# Patient Record
Sex: FEMALE | Race: BLACK OR AFRICAN AMERICAN | NOT HISPANIC OR LATINO | Employment: UNEMPLOYED | URBAN - METROPOLITAN AREA
[De-identification: names, ages, dates, MRNs, and addresses within clinical notes are randomized per-mention and may not be internally consistent; named-entity substitution may affect disease eponyms.]

---
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
## 2021-08-03 ENCOUNTER — HOSPITAL ENCOUNTER (EMERGENCY)
Facility: HOSPITAL | Age: 21
Discharge: HOME | End: 2021-08-03
Attending: EMERGENCY MEDICINE | Admitting: EMERGENCY MEDICINE
Payer: COMMERCIAL

## 2021-08-03 VITALS
HEART RATE: 95 BPM | OXYGEN SATURATION: 99 % | BODY MASS INDEX: 21.3 KG/M2 | DIASTOLIC BLOOD PRESSURE: 65 MMHG | SYSTOLIC BLOOD PRESSURE: 105 MMHG | RESPIRATION RATE: 18 BRPM | WEIGHT: 130 LBS | TEMPERATURE: 99.3 F

## 2021-08-03 DIAGNOSIS — Z20.822 PERSON UNDER INVESTIGATION FOR COVID-19: ICD-10-CM

## 2021-08-03 LAB — SARS-COV-2 RNA RESP QL NAA+PROBE: POSITIVE

## 2021-08-03 PROCEDURE — C9803 HOPD COVID-19 SPEC COLLECT: HCPCS

## 2021-08-03 PROCEDURE — 87635 SARS-COV-2 COVID-19 AMP PRB: CPT | Performed by: EMERGENCY MEDICINE

## 2021-08-03 PROCEDURE — 99283 EMERGENCY DEPT VISIT LOW MDM: CPT

## 2021-08-03 RX ORDER — ONDANSETRON 4 MG/1
4 TABLET, ORALLY DISINTEGRATING ORAL EVERY 8 HOURS PRN
Qty: 12 TABLET | Refills: 0 | Status: SHIPPED | OUTPATIENT
Start: 2021-08-03 | End: 2021-08-06

## 2021-08-03 ASSESSMENT — ENCOUNTER SYMPTOMS
FEVER: 0
WHEEZING: 0
ARTHRALGIAS: 0
HEADACHES: 0
APPETITE CHANGE: 0
FLANK PAIN: 0
COLOR CHANGE: 0
MYALGIAS: 0
FATIGUE: 0
ACTIVITY CHANGE: 0
HEMATURIA: 0
LIGHT-HEADEDNESS: 0
JOINT SWELLING: 0
NAUSEA: 1
VOMITING: 0
DIARRHEA: 0
COUGH: 1
ABDOMINAL PAIN: 0
RHINORRHEA: 0
SHORTNESS OF BREATH: 0

## 2021-08-03 NOTE — ED PROVIDER NOTES
Emergency Department Encounter     Evaluation Date & Time:   No admission date for patient encounter.    CHIEF COMPLAINT:  Suspected Covid      Triage Note:Exposure to COVID from boyfriend; test earlier today, here for another test since that one isn't back yet.        Impression and Plan     ED COURSE & MEDICAL DECISION MAKIN:55 PM I met with the patient, obtained an initial history, performed an examination and discussed the plan. PPE worn throughout all interactions with the patient, including gloves, surgical mask, N95 mask, safety glasses, and surgical cap.     20-year-old female presenting for evaluation.  Patient presenting with symptoms consistent with COVID-19 including cough, body aches, subjective fevers, loss of smell and taste.  The patient is well-appearing in no distress.  Boyfriend had COVID-19 and she has been exposed.  Patient denies any chest pain or shortness of breath.  Had a COVID-19 test at [facility] done earlier today but does not want a wait 48 hours with the results.  Discussed with her that we will test her for COVID-19 however would not have the results for probably more than 1 hour, after discussion patient is okay with discharge with being called the results of positive.  Patient's no additional questions or concerns.  Signs are stable oxygen saturations greater than 99%.  Not feel that further work-up necessary.    At the conclusion of the encounter I discussed the results of all the tests and the disposition. The questions were answered. The patient or family acknowledged understanding and was agreeable with the care plan.    FINAL IMPRESSION:    ICD-10-CM    1. Person under investigation for COVID-19  Z20.822 Primary Care Referral (ED)       0 minutes of critical care time    Reassessments & Consults       MEDICATIONS GIVEN IN THE EMERGENCY DEPARTMENT:  Medications - No data to display    NEW PRESCRIPTIONS STARTED AT TODAY'S ED VISIT:  New Prescriptions    ONDANSETRON  (ZOFRAN ODT) 4 MG ODT TAB    Take 1 tablet (4 mg) by mouth every 8 hours as needed for nausea       HPI     HPI     Ayyantuu ALICIA Mancia is a 20 year old female with a pertinent history of myasthenia gravis, seasonal allergies who presents to this ED by means of private vehicle for evaluation of COVID-19 exposure.     The patient reports her boyfriend was diagnosed with COVID-19 and over the last two days she has been experiencing similar symptoms. She endorses, dry cough, loss of taste and smell, and sudden onset nausea. She received a COVID-19 test from [facility] earlier today but that will not result for 48 hours so she came into the ED to receive a faster result. She is otherwise in her usual state of health and denies any chest pain, shortness of breath, or any other concerns at this time.     REVIEW OF SYSTEMS:  Review of Systems   Constitutional: Negative for activity change, appetite change, fatigue and fever.        Positive for loss of taste and smell.    HENT: Negative for congestion and rhinorrhea.    Respiratory: Positive for cough. Negative for shortness of breath and wheezing.    Cardiovascular: Negative for chest pain and leg swelling.   Gastrointestinal: Positive for nausea. Negative for abdominal pain, diarrhea and vomiting.   Genitourinary: Negative for flank pain, hematuria and urgency.   Musculoskeletal: Negative for arthralgias, joint swelling and myalgias.   Skin: Negative for color change and rash.   Neurological: Negative for syncope, light-headedness and headaches.   Psychiatric/Behavioral: Negative for self-injury and suicidal ideas.   All other systems reviewed and are negative.        Medical History     Past Medical History:   Diagnosis Date     Allergic state      Myasthenia gravis (H)      Uncomplicated asthma        Past Surgical History:   Procedure Laterality Date     BRONCHOSCOPY (RIGID OR FLEXIBLE), DIAGNOSTIC N/A 8/15/2018    Procedure: COMBINED BRONCHOSCOPY (RIGID OR FLEXIBLE),  LAVAGE;  Flexible Bronchoscopy with Lavage (BAL) , Left Thoracoscopic Thymectomy ;  Surgeon: Vanessa Danielson MD;  Location: [facility] OR     NO HISTORY OF SURGERY       THORACOSCOPIC THYMECTOMY Left 8/15/2018    Procedure: THORACOSCOPIC THYMECTOMY;;  Surgeon: Arias Estevez MD;  Location: [facility] OR       Family History   Problem Relation Age of Onset     Other - See Comments Sister        Social History     Tobacco Use     Smoking status: Never Smoker     Smokeless tobacco: Never Used   Substance Use Topics     Alcohol use: No     Drug use: No       ondansetron (ZOFRAN ODT) 4 MG ODT tab  albuterol (2.5 MG/3ML) 0.083% neb solution  ORDER FOR DME, SET TO LOCAL PRINT,  pyridostigmine (MESTINON) 60 MG tablet        Physical Exam     First Vitals:  Patient Vitals for the past 24 hrs:   BP Temp Temp src Pulse Resp SpO2 Weight   08/03/21 1734 105/65 99.3  F (37.4  C) Oral 95 18 99 % 59 kg (130 lb)       PHYSICAL EXAM:   Physical Exam  Vitals and nursing note reviewed.   Constitutional:       General: She is not in acute distress.     Appearance: Normal appearance. She is not ill-appearing.   HENT:      Head: Normocephalic and atraumatic.      Right Ear: External ear normal.      Left Ear: External ear normal.      Nose: Nose normal.      Mouth/Throat:      Mouth: Mucous membranes are moist.   Eyes:      Extraocular Movements: Extraocular movements intact.      Pupils: Pupils are equal, round, and reactive to light.   Cardiovascular:      Rate and Rhythm: Normal rate and regular rhythm.   Pulmonary:      Effort: Pulmonary effort is normal. No respiratory distress.      Breath sounds: Normal breath sounds. No stridor. No wheezing.   Abdominal:      General: There is no distension.      Palpations: Abdomen is soft. There is no mass.      Tenderness: There is no abdominal tenderness. There is no guarding or rebound.      Hernia: No hernia is present.   Musculoskeletal:         General: No swelling, tenderness or signs of  injury. Normal range of motion.      Cervical back: Normal range of motion.   Skin:     General: Skin is warm and dry.      Capillary Refill: Capillary refill takes less than 2 seconds.   Neurological:      General: No focal deficit present.      Mental Status: She is alert and oriented to person, place, and time.      Cranial Nerves: No cranial nerve deficit.      Motor: No weakness.      Coordination: Coordination normal.      Gait: Gait normal.   Psychiatric:         Mood and Affect: Mood normal.           Results     LAB:  All pertinent labs reviewed and interpreted  Labs Ordered and Resulted from Time of ED Arrival Up to the Time of Departure from the ED   SARS-COV2 (COVID-19) VIRUS RT-PCR - Abnormal; Notable for the following components:       Result Value    SARS CoV2 PCR Positive (*)     All other components within normal limits    Narrative:     Testing was performed using the wil  SARS-CoV-2 & Influenza A/B Assay on the wil  Valarie  System.  This test should be ordered for the detection of SARS-COV-2 in individuals who meet SARS-CoV-2 clinical and/or epidemiological criteria. Test performance is unknown in asymptomatic patients.  This test is for in vitro diagnostic use under the FDA EUA for laboratories certified under CLIA to perform moderate and/or high complexity testing. This test has not been FDA cleared or approved.  A negative test does not rule out the presence of PCR inhibitors in the specimen or target RNA in concentration below the limit of detection for the assay. The possibility of a false negative should be considered if the patient's recent exposure or clinical presentation suggests COVID-19.  [facility] are certified under the Clinical Laboratory Improvement Amendments of 1988 (CLIA-88) as qualified to perform moderate and/or high complexity laboratory testing.   COVID-19 VIRUS (CORONAVIRUS) BY PCR    Narrative:     The following orders were created for panel order  Symptomatic COVID-19 Virus (Coronavirus) by PCR Nasopharyngeal.  Procedure                               Abnormality         Status                     ---------                               -----------         ------                     SARS-COV2 (COVID-19) Vir...[[id number]]  Abnormal            Final result                 Please view results for these tests on the individual orders.       RADIOLOGY:  No orders to display              [facility] Documentation       Chacorta Tom DO  Emergency Medicine  [facility] EMERGENCY DEPARTMENT       Chacorta Tom DO  08/03/21 [id number]

## 2021-08-03 NOTE — ED NOTES
Patient left prior to reviewing discharge instructions/RX.  Attempted to call patient without response.  Will mail discharge instructions and RX to patient.

## 2021-08-03 NOTE — ED TRIAGE NOTES
Exposure to COVID from boyfriend; test earlier today, here for another test since that one isn't back yet.

## 2021-10-22 ENCOUNTER — TELEPHONE (OUTPATIENT)
Dept: NEUROLOGY | Facility: CLINIC | Age: 21
End: 2021-10-22

## 2021-10-22 NOTE — TELEPHONE ENCOUNTER
Attempted to call patient x2 due to a page call that she was experiencing weakness. No answer, no voicemail.

## 2021-11-01 ENCOUNTER — OFFICE VISIT (OUTPATIENT)
Dept: NEUROLOGY | Facility: CLINIC | Age: 21
End: 2021-11-01
Payer: COMMERCIAL

## 2021-11-01 VITALS
OXYGEN SATURATION: 99 % | RESPIRATION RATE: 16 BRPM | DIASTOLIC BLOOD PRESSURE: 78 MMHG | SYSTOLIC BLOOD PRESSURE: 119 MMHG | HEART RATE: 74 BPM

## 2021-11-01 DIAGNOSIS — G70.00 MYASTHENIA GRAVIS WITHOUT EXACERBATION (H): Primary | ICD-10-CM

## 2021-11-01 PROCEDURE — 99203 OFFICE O/P NEW LOW 30 MIN: CPT | Mod: GC | Performed by: PSYCHIATRY & NEUROLOGY

## 2021-11-01 ASSESSMENT — PAIN SCALES - GENERAL: PAINLEVEL: NO PAIN (0)

## 2021-11-01 NOTE — PATIENT INSTRUCTIONS
Please contact us (phone or [facility]) if you would like to restart Mestinon or start another immunosuppressant medication

## 2021-11-01 NOTE — NURSING NOTE
Chief Complaint   Patient presents with     RECHECK     [facility] RETURN MYASTHENIA GRAVIS      Allen óGmez

## 2021-11-01 NOTE — PROGRESS NOTES
"             Pediatric Neurology Clinic      Tushar Mancia MRN# [id number]   YOB: 2000 Age: 19 year old      Date of Visit: Nov 1, 2021    Primary care provider: No Ref-Primary, Physician         Chief Complaint:     Follow up myasthenia gravis.       History is obtained from the patient, family and medical record       History of Present Illness:      Tushar Mancia is a 21 year old female who was seen and examined at the pediatric neurology clinic on Nov 1, 2021 for evaluation of myasthenia gravis, last seen in clinic in 2019.    Has noticed occasional drooping of the right eyelid, usually at the end of the day or when she is tired. DV will come and go, worse when tired, couple times a week. Will occasionally choke on saliva, but denies outright dysphagia (worse when sick). Some weakness and shooting pains in the arms/legs. Overall feels pretty stable in the last 6 months.  Notes that everything has been improved in the last 2 years since her thymus was removed. With the cold notices her joints/muscles are more painful. Stopped Mestinon back in 2019.     She still is experiencing shortness of breath with exercise, fatigue (Vit D deficient), and lightheadedness often in the AM or at the end of a long day. Has troubles with \"working out\" more from pain than weakness, but is harder to do basic exercises. Feels hard to keep a job due to feeling sick (minor illness are significant), hasn't worked full time in last 6 months. 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              Past Medical History:     Past Medical History:   Diagnosis Date     Allergic state      Myasthenia gravis (H)      Uncomplicated asthma               Past Surgical History:     Past Surgical History:   Procedure Laterality Date     BRONCHOSCOPY (RIGID OR FLEXIBLE), DIAGNOSTIC N/A 8/15/2018    Procedure: COMBINED BRONCHOSCOPY (RIGID OR FLEXIBLE), LAVAGE;  Flexible Bronchoscopy with Lavage (BAL) , Left " Thoracoscopic Thymectomy ;  Surgeon: Vanessa Danielson MD;  [facility]     NO HISTORY OF SURGERY       THORACOSCOPIC THYMECTOMY Left 8/15/2018    Procedure: THORACOSCOPIC THYMECTOMY;;  Surgeon: Arias Estevez MD;  [facility] OR             Social History:      Pediatric History   Patient Parents     Aman Callahan (Mother)     Blanche Shrestha (Father)     Other Topics Concern     Not on file   Social History Narrative    May 10, 2018.date:     Tushar lives with her parents two sisters and two brothers. They have no pets.     Tushar is in the 11th grade and does well in school. She works at [employer].     Dad is a registered nurse and Mom is a nursing assistant.     There are no significant stressors at home or school.             Family History:     Family History   Problem Relation Age of Onset     Other - See Comments Sister              Immunizations:     Immunization History   Administered Date(s) Administered     Comvax (HIB/HepB) 2000, 2001, 09/03/2002     DTAP (<7y) 2001, 2001, 03/05/2002, 11/27/2002, 08/30/2006     HEPA 05/06/2003, 05/31/2005     HPV 06/07/2013, 11/26/2013, 04/03/2014     Influenza (H1N1) 12/04/2009, 12/28/2009     Influenza Vaccine, 6+MO IM (QUADRIVALENT W/PRESERVATIVES) 11/27/2002, 11/07/2005, 12/04/2009, 09/22/2011, 11/26/2013     MMR 11/27/2002, 08/30/2006     Meningococcal (Menomune ) 06/07/2013     Pneumococcal (PCV 7) 2001, 2001, 03/05/2002     Poliovirus, inactivated (IPV) 2001, 2001, 03/05/2002, 08/30/2006     Tdap (Adacel,Boostrix) 06/07/2013     Typhoid IM 05/06/2003     Varicella 09/03/2002, 08/30/2006     Yellow Fever 05/06/2003            Allergies:    No Known Allergies          Medications:     Prescription Medications as of 11/1/2021       Rx Number Disp Refills Start End Last Dispensed Date Next Fill Date Owning Pharmacy    albuterol (2.5 MG/3ML) 0.083% neb solution            Sig: Take 1 vial by  nebulization every 6 hours as needed for shortness of breath / dyspnea or wheezing    Class: Historical    Route: Nebulization    ORDER FOR DME, SET TO LOCAL PRINT,  1 Device 0 5/21/2015    [facility] - [place], [place] - [address], [address]    Sig: Equipment being ordered: knee brace    Class: Local Print    pyridostigmine (MESTINON) 60 MG tablet  360 tablet 3 9/19/2018    [id number] - [place], [place] - [address] AT [facility]    Sig: Take 1 tablet (60 mg) by mouth 4 times daily    Class: E-Prescribe    Route: Oral         Not taking medications         Review of Systems:   The 10 point Review of Systems is negative other than noted in the HPI         Physical Exam:     /78   Pulse 74   Resp 16   SpO2 99%    Physical Exam:   General: NAD, cooperative on exam   Neurologic:   Mental Status Exam: Alert, awake and easily engaged in interaction.   Cranial Nerves: EOMs intact, no nystagmus, facial movements symmetric,                 facial sensation intact to light touch, hearing intact to conversation, palate and uvula               rise symmetrically, no deviation in uvula or tongue, tongue midline and fully mobile                with no atrophy or fasciculations.  Motor: Normal tone in all four extremities, no atrophy or fasciculations. No tremors.           Manual muscle examination: Neck flexion 4.5, shoulder abduction 4.5, eld ext., flx, wrist flx and ext.  5/5, FDI 4/5; hip flexion 5/5, knee ext/flx 5, foot dorsiflx and plantar flx 5.  Myasthenia Gravis Worksheet    Test item None Mild Moderate Severe Score   Grade 0 1 2 3    Diplopia (lateral gaze), R or L, s 60 11-59 1-10 spontaneous 1   Ptosis (upward gaze), s 60 11-59 1-10 spontaneous 1   Facial weakness   (Eyelid closure) Normal Some resistence No resistance Incomplete 1   Dysarthria with counting 1-50 >50 30-49 10-29 9 0   Swallowing 4 oz water Normal mild  Severe (regurgitation)  Not able    Right arm held outstretched at 90 deg, s 240  10-89 0-9 1 (95)   Left arm held outstretched at 90 deg, s 240  10-89 0-9 1 (95)   Vital capacity, % predicted 80 65-79 50-64 <50    Right hand , kgW, M/F 45/30 15-44/10-29 5-14/5-9 0-4/0-4 ND   Left hand , kgW, man/woman 35/25 15-34/10-24 5-14/5-9 0-4/0-4 ND   Head lift 45 deg supine, s 120  1-29 0 2(15)   Right leg held outstretched at 45 deg supine, s 100 31-99 1-30 0 2 (22)   Left leg held outstretched at 45 deg supine, s 100 31-99 1-30 0 2 (26)     Score 11     Sensory: intact LT   Coordination: No overt dysmetria seen.    Reflexes: 2+ and symmetric in triceps, biceps, brachioradialis, patellar, Achilles.            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 Her symptoms are partially controlled mainly involving skeletal muscle with good bulbar and respiratory function but as obvious disease activity on testing. Discussed pros and cons of mestinon and other immunosuppressant medications. Discussed potential for long term treatment, especially when she is out of school.  Patient declined use of at this time. Will contact the clinic if she would like to restart.     Recommendations:  -  F/U in 6 months    Christine Russ MD  Neuromuscular Fellow    I personally examined this patient with the fellow Dr. Russ.  This note details our findings, and the impression and plan that we formulated together.  Our recommendations were discussed with the other providers and patient and/or family.   I have spent at least 30 min on the date of the encounter in chart review, patient visit, review of tests, counseling the patient, documentation about the issues documented  above. See note for details.    Sincerely,        Obi Dave MD  Pediatric Neurology  [phone]           CC  Patient Care Team:  No Ref-Primary, Physician as PCP - General  Neha Connelly,  as PCP - Pediatrics  Leda Quinn MD as MD (Pediatric Rheumatology)  Obi Dave MD as MD (Psychiatry & Neurology - Child & Adolescent Psychiatry)  SELF, REFERRED    Copy to patient  [facility]  [address]  [place] [id number]

## 2021-11-12 ENCOUNTER — HOSPITAL ENCOUNTER (EMERGENCY)
Facility: HOSPITAL | Age: 21
Discharge: HOME | End: 2021-11-12
Attending: EMERGENCY MEDICINE | Admitting: EMERGENCY MEDICINE
Payer: COMMERCIAL

## 2021-11-12 VITALS
DIASTOLIC BLOOD PRESSURE: 66 MMHG | BODY MASS INDEX: 21.31 KG/M2 | WEIGHT: 130.07 LBS | TEMPERATURE: 98.1 F | RESPIRATION RATE: 17 BRPM | SYSTOLIC BLOOD PRESSURE: 125 MMHG | HEART RATE: 86 BPM | OXYGEN SATURATION: 96 %

## 2021-11-12 DIAGNOSIS — Z87.898 HISTORY OF PERSISTENT COUGH: ICD-10-CM

## 2021-11-12 DIAGNOSIS — R10.9 LEFT FLANK PAIN: ICD-10-CM

## 2021-11-12 LAB
ALBUMIN UR-MCNC: 30 MG/DL
APPEARANCE UR: CLEAR
BILIRUB UR QL STRIP: NEGATIVE
COLOR UR AUTO: YELLOW
FLUAV RNA SPEC QL NAA+PROBE: NEGATIVE
FLUBV RNA RESP QL NAA+PROBE: NEGATIVE
GLUCOSE UR STRIP-MCNC: NEGATIVE MG/DL
HGB UR QL STRIP: NEGATIVE
KETONES UR STRIP-MCNC: 40 MG/DL
LEUKOCYTE ESTERASE UR QL STRIP: NEGATIVE
MUCOUS THREADS #/AREA URNS LPF: PRESENT /LPF
NITRATE UR QL: NEGATIVE
PH UR STRIP: 5.5 [PH] (ref 5–7)
RBC URINE: 2 /HPF
SARS-COV-2 RNA RESP QL NAA+PROBE: NEGATIVE
SP GR UR STRIP: 1.03 (ref 1–1.03)
SQUAMOUS EPITHELIAL: 3 /HPF
TRANSITIONAL EPI: <1 /HPF
UROBILINOGEN UR STRIP-MCNC: <2 MG/DL
WBC URINE: 2 /HPF

## 2021-11-12 PROCEDURE — 99283 EMERGENCY DEPT VISIT LOW MDM: CPT

## 2021-11-12 PROCEDURE — 81001 URINALYSIS AUTO W/SCOPE: CPT | Performed by: EMERGENCY MEDICINE

## 2021-11-12 PROCEDURE — C9803 HOPD COVID-19 SPEC COLLECT: HCPCS

## 2021-11-12 PROCEDURE — 87636 SARSCOV2 & INF A&B AMP PRB: CPT | Performed by: EMERGENCY MEDICINE

## 2021-11-12 RX ORDER — PANTOPRAZOLE SODIUM 40 MG/1
40 TABLET, DELAYED RELEASE ORAL DAILY
Qty: 30 TABLET | Refills: 0 | Status: SHIPPED | OUTPATIENT
Start: 2021-11-12 | End: 2021-12-12

## 2021-11-12 RX ORDER — ALBUTEROL SULFATE 0.83 MG/ML
2.5 SOLUTION RESPIRATORY (INHALATION) EVERY 4 HOURS PRN
Qty: 60 ML | Refills: 0 | Status: SHIPPED | OUTPATIENT
Start: 2021-11-12

## 2021-11-12 RX ORDER — NAPROXEN 500 MG/1
250 TABLET ORAL 2 TIMES DAILY PRN
Qty: 24 TABLET | Refills: 0 | Status: SHIPPED | OUTPATIENT
Start: 2021-11-12 | End: 2021-11-20

## 2021-11-12 NOTE — ED TRIAGE NOTES
Pt states she's had a cough for the past several days and on Wednesday she states when she coughed she felt like she pulled a muscle in in her left flank area.  Pt denies taking anything for the pain.

## 2021-11-13 NOTE — ED PROVIDER NOTES
EMERGENCY DEPARTMENT NOTE     Name: Tushar Mancia    Age/Sex: 21 year old female   MRN: [id number]   Evaluation Date & Time:  11/12/2021  7:19 PM    PCP:    No Ref-Primary, Physician   ED Provider: Jean Marie Kwan D.O.       CHIEF COMPLAINT    Abdominal Pain       DIAGNOSIS & DISPOSITION     1. Left flank pain    2. History of persistent cough      DISPOSITION: Home    At the conclusion of the encounter I discussed the results of all of the tests and the disposition. The questions were answered. The patient or family acknowledged understanding and was agreeable with the care plan.    TOTAL CRITICAL CARE TIME (EXCLUDING PROCEDURES): Not applicable    PROCEDURES:   None    EMERGENCY DEPARTMENT COURSE/MEDICAL DECISION MAKING   8:04 PM  I met with the patient to gather history and to perform my initial exam.  We discussed treatment options and the plan for care while in the Emergency Department.  8:14 PM Patient to be discharged by RN.    Tushar Mancia female with a relevant past history of GERD, Myasthenia gravis with exacerbation, asthma, s/p thymectomy, who presents to this ED via walk-in for evaluation of abdominal pain.    Patient reports a cough for the past several days and notes that two days ago, she developed left lower quadrant abdominal pain. She has taken Ibuprofen and used ice without relief.    Triage note reviewed:Pt states she's had a cough for the past several days and on Wednesday she states when she coughed she felt like she pulled a muscle in in her left flank area.  Pt denies taking anything for the pain.    Vital signs:/66   Pulse 86   Temp 98.1  F (36.7  C) (Oral)   Resp 17   Wt 59 kg (130 lb 1.1 oz)   SpO2 96%   BMI 21.31 kg/m    Pertinent physical exam findings:  Cardiac: Regular rate and rhythm S1-S2 without murmur rub  Pulmonary: Lungs are clear to ascultation bilaterally with good breath sounds  Abdomen: Soft nontender, positive bowel sounds.  No organomegaly or  mass  Diagnostic studies:  Imaging:None  Lab: COVID-19, influenza negative UA without pyuria, hematuria or bacteriuria  Interventions:None  Medical decision making: Patient has not required pain medication in the emergency department.  With nontender abdominal exam low suspicion for intra-abdominal process including ovarian cyst rupture, ovarian torsion, obstructing urolithiasis, diverticulitis, colitis, appendicitis.  Cough is nonproductive, she has no associated shortness of breath, hemoptysis and low suspicion for pneumonia or other process including pulmonary embolism.  I discussed options with the patient and her mother and they are comfortable with discharge with management of cough and pain medication without further evaluation including imaging.  Etiology of cough is not identified.  She has had some mild URI symptoms.  Influenza Covid negative.  No history of asthma but in the past has used albuterol nebulizer for cough management and will represcribe.  Not currently wheezing and steroids not indicated.  She does report some symptoms of reflux and will start her on Protonix.  Patient will use Tylenol and Naprosyn for pain management.  Patient will follow up with primary care physician early next week.  Return criteria discussed and if progressive symptoms with development of chest pain, shortness of breath, fever, abdominal pain that persists not improved with pain medication and not associated with coughing will return to the emergency department.    ED INTERVENTIONS   Medications - No data to display    DISCHARGE MEDICATIONS        Review of your medicines      UNREVIEWED medicines. Ask your doctor about these medicines      Dose / Directions   pyridostigmine 60 MG tablet  Commonly known as: MESTINON  Used for: Myasthenia gravis (H)      Dose: 60 mg  Take 1 tablet (60 mg) by mouth 4 times daily  Quantity: 360 tablet  Refills: 3        START taking      Dose / Directions   naproxen 500 MG tablet  Commonly  known as: NAPROSYN      Dose: 250 mg  Take 0.5 tablets (250 mg) by mouth 2 times daily as needed for moderate pain  Quantity: 24 tablet  Refills: 0     pantoprazole 40 MG EC tablet  Commonly known as: Protonix      Dose: 40 mg  Take 1 tablet (40 mg) by mouth daily for 30 doses  Quantity: 30 tablet  Refills: 0        CONTINUE these medicines which may have CHANGED, or have new prescriptions. If we are uncertain of the size of tablets/capsules you have at home, strength may be listed as something that might have changed.      Dose / Directions   albuterol (2.5 MG/3ML) 0.083% neb solution  Commonly known as: PROVENTIL  This may have changed:     when to take this    reasons to take this      Dose: 2.5 mg  Take 1 vial (2.5 mg) by nebulization every 4 hours as needed (cough)  Quantity: 60 mL  Refills: 0        CONTINUE these medicines which have NOT CHANGED      Dose / Directions   order for DME  Used for: Right knee pain      Equipment being ordered: knee brace  Quantity: 1 Device  Refills: 0           Where to get your medicines      Some of these will need a paper prescription and others can be bought over the counter. Ask your nurse if you have questions.    Bring a paper prescription for each of these medications    albuterol (2.5 MG/3ML) 0.083% neb solution    naproxen 500 MG tablet    pantoprazole 40 MG EC tablet           INFORMATION SOURCE AND LIMITATIONS    History/Exam limitations: None  Patient information was obtained from: Patient  Use of : N/A    HISTORY OF PRESENT ILLNESS   Tushar Mancia female with a relevant past history of GERD, Myasthenia gravis with exacerbation, asthma, s/p thymectomy, who presents to this ED via walk-in for evaluation of abdominal pain.    Patient reports a cough for the past several days and notes that two days ago, she developed left lower quadrant abdominal pain. She has taken Ibuprofen and used ice without relief.    Denies any chest pain and shortness of  breath. Patient denies any history of asthma. Patient is unvaccinated against COVID. No other complaints at this time.     REVIEW OF SYSTEMS:   Constitutional: Negative for  fever.   HENT: Negative for URI symptoms or sore throat.    Cardiac: Negative for  chest pain,palpitations, near syncope or syncope  Respiratory: Negative for shortness of breath. Positive for cough.   Gastrointestinal: Negative for nausea, vomiting, constipation, diarrhea, rectal bleeding or melena. Positive for left lower quadrant abdominal pain.   Genitourinary: Negative for dysuria, flank pain and hematuria.   Musculoskeletal: Negative for back pain.   Skin: Negative for  rash  Neurological: Negative for dizziness, headache, syncope, speech difficulty, unilateral weakness or imbalance with walking.   Hematological: Negative for adenopathy. Does not bruise/bleed easily.   Psychiatric/Behavioral: Negative for confusion.       PATIENT HISTORY     Past Medical History:   Diagnosis Date     Allergic state      Myasthenia gravis (H)      Uncomplicated asthma      Patient Active Problem List   Diagnosis     Seasonal allergic rhinitis     Myasthenia gravis (H)     S/P thymectomy     Brachial plexopathy secondary to stretching injury     Myasthenia gravis with exacerbation (H)     Past Surgical History:   Procedure Laterality Date     BRONCHOSCOPY (RIGID OR FLEXIBLE), DIAGNOSTIC N/A 8/15/2018    Procedure: COMBINED BRONCHOSCOPY (RIGID OR FLEXIBLE), LAVAGE;  Flexible Bronchoscopy with Lavage (BAL) , Left Thoracoscopic Thymectomy ;  Surgeon: Vanessa Danielson MD;  Location: [facility]     NO HISTORY OF SURGERY       THORACOSCOPIC THYMECTOMY Left 8/15/2018    Procedure: THORACOSCOPIC THYMECTOMY;;  Surgeon: Arias Estevez MD;  Location: [facility] OR     Social Histrory  Smoking:  Alcohol Use:  No Known Allergies      OUTPATIENT MEDICATIONS     Discharge Medication List as of 11/12/2021  8:14 PM      START taking these medications    Details   naproxen  (NAPROSYN) 500 MG tablet Take 0.5 tablets (250 mg) by mouth 2 times daily as needed for moderate pain, Disp-24 tablet, R-0, Local Print      pantoprazole (PROTONIX) 40 MG EC tablet Take 1 tablet (40 mg) by mouth daily for 30 doses, Disp-30 tablet, R-0, Local Print            Vitals:    11/12/21 1542 11/12/21 1943   BP: 132/73 125/66   Pulse: 83 86   Resp: 16 17   Temp: 99.2  F (37.3  C) 98.1  F (36.7  C)   TempSrc: Oral Oral   SpO2: 98% 96%   Weight: 59 kg (130 lb 1.1 oz)        Physical Exam   Constitutional: Oriented to person, place, and time. Appears well-developed and well-nourished.   HEENT:    Head: Atraumatic.   Neck: Normal range of motion. Neck supple.   Cardiovascular: Normal rate, regular rhythm and normal heart sounds.    Pulmonary/Chest: Normal effort  and breath sounds normal.   Abdominal: Soft. Bowel sounds are normal.   Musculoskeletal: Normal range of motion.   Neurological: Alert and oriented to person, place, and time. Normal strength.No sensory deficit. No cranial nerve deficit . Skin: Skin is warm and dry.   Psychiatric: Normal mood and affect. Behavior is normal. Thought content normal.       DIAGNOSTICS    LABORATORY FINDINGS (REVIEWED AND INTERPRETED):  Labs Ordered and Resulted from Time of ED Arrival to Time of ED Departure   ROUTINE UA WITH MICROSCOPIC REFLEX TO CULTURE - Abnormal       Result Value    Color Urine Yellow      Appearance Urine Clear      Glucose Urine Negative      Bilirubin Urine Negative      Ketones Urine 40  (*)     Specific Gravity Urine 1.035 (*)     Blood Urine Negative      pH Urine 5.5      Protein Albumin Urine 30  (*)     Urobilinogen Urine <2.0      Nitrite Urine Negative      Leukocyte Esterase Urine Negative      Mucus Urine Present (*)     RBC Urine 2      WBC Urine 2      Squamous Epithelials Urine 3 (*)     Transitional Epithelials Urine <1     INFLUENZA A/B & SARS-COV2 PCR MULTIPLEX - Normal    Influenza A target Negative      Influenza B target Negative       SARS CoV2 PCR Negative           IMAGING (REVIEWED AND INTERPRETED):  No orders to display           ECG (REVIEWED AND INTERPRETED):       I, Ramona Singh, am serving as a scribe to document services personally performed by Jean Marie Kwan D.O., based on my observation and the provider s statements to me.    I, Jean Marie Kwan D.O., attest that Ramona Singh is acting in a scribe capacity, has observed my performance of the services and has documented them in accordance with my direction.    Jean Marie Kwan D.O.  EMERGENCY MEDICINE   11/12/21  [facility] EMERGENCY DEPARTMENT  [address] [place] [id number]  [phone]  Dept: [phone]     Jean Marie Kwan DO  11/13/21 2212

## 2021-11-13 NOTE — DISCHARGE INSTRUCTIONS
Take Tylenol 650 mg 4 times daily and Naprosyn 2 times daily for pain.  Start Protonix daily.  Use albuterol nebulizer up to every 4 hours as needed for cough management.  If recurrent flank or abdominal pain that persists and is not improved with Tylenol ibuprofen or develop any new symptoms including chest pain or shortness of breath return to the emergency department.  See your primary care clinic in follow-up otherwise early next week.

## 2021-11-15 DIAGNOSIS — G70.00 MYASTHENIA GRAVIS (H): ICD-10-CM

## 2021-11-15 RX ORDER — PYRIDOSTIGMINE BROMIDE 60 MG/1
60 TABLET ORAL 4 TIMES DAILY
Qty: 360 TABLET | Refills: 3 | Status: SHIPPED | OUTPATIENT
Start: 2021-11-15 | End: 2022-05-02

## 2022-02-15 ENCOUNTER — HOSPITAL ENCOUNTER (EMERGENCY)
Facility: CLINIC | Age: 22
Discharge: HOME | End: 2022-02-15
Attending: EMERGENCY MEDICINE | Admitting: EMERGENCY MEDICINE
Payer: COMMERCIAL

## 2022-02-15 ENCOUNTER — APPOINTMENT (OUTPATIENT)
Dept: RADIOLOGY | Facility: CLINIC | Age: 22
End: 2022-02-15
Attending: EMERGENCY MEDICINE
Payer: COMMERCIAL

## 2022-02-15 VITALS
DIASTOLIC BLOOD PRESSURE: 82 MMHG | OXYGEN SATURATION: 99 % | TEMPERATURE: 98.8 F | SYSTOLIC BLOOD PRESSURE: 112 MMHG | HEART RATE: 92 BPM | HEIGHT: 66 IN | BODY MASS INDEX: 20.89 KG/M2 | RESPIRATION RATE: 16 BRPM | WEIGHT: 130 LBS

## 2022-02-15 DIAGNOSIS — J06.9 VIRAL UPPER RESPIRATORY ILLNESS: ICD-10-CM

## 2022-02-15 DIAGNOSIS — Z20.822 SUSPECTED 2019 NOVEL CORONAVIRUS INFECTION: ICD-10-CM

## 2022-02-15 DIAGNOSIS — R05.9 COUGH: ICD-10-CM

## 2022-02-15 PROBLEM — J45.901 MODERATE ASTHMA WITH ACUTE EXACERBATION, UNSPECIFIED WHETHER PERSISTENT: Status: ACTIVE | Noted: 2017-12-07

## 2022-02-15 PROBLEM — L85.3 DRY SKIN: Status: ACTIVE | Noted: 2017-10-25

## 2022-02-15 PROBLEM — E55.9 VITAMIN D DEFICIENCY: Status: ACTIVE | Noted: 2021-05-14

## 2022-02-15 PROBLEM — H02.401 PTOSIS OF RIGHT EYELID: Status: ACTIVE | Noted: 2018-07-09

## 2022-02-15 PROBLEM — L70.9 ACNE: Status: ACTIVE | Noted: 2021-05-14

## 2022-02-15 PROBLEM — N64.52 NIPPLE DISCHARGE IN FEMALE: Status: ACTIVE | Noted: 2020-10-07

## 2022-02-15 LAB
FLUAV RNA SPEC QL NAA+PROBE: NEGATIVE
FLUBV RNA RESP QL NAA+PROBE: NEGATIVE
SARS-COV-2 RNA RESP QL NAA+PROBE: NEGATIVE

## 2022-02-15 PROCEDURE — 71046 X-RAY EXAM CHEST 2 VIEWS: CPT

## 2022-02-15 PROCEDURE — C9803 HOPD COVID-19 SPEC COLLECT: HCPCS

## 2022-02-15 PROCEDURE — 87636 SARSCOV2 & INF A&B AMP PRB: CPT | Performed by: EMERGENCY MEDICINE

## 2022-02-15 PROCEDURE — 99284 EMERGENCY DEPT VISIT MOD MDM: CPT | Mod: 25

## 2022-02-15 ASSESSMENT — ENCOUNTER SYMPTOMS
COUGH: 1
HEADACHES: 1
SORE THROAT: 1

## 2022-02-15 ASSESSMENT — MIFFLIN-ST. JEOR: SCORE: 1376.43

## 2022-02-15 NOTE — ED TRIAGE NOTES
Had productive cough with yellow sputum since Saturday.  Complaining of mid chest pain. Complaining of nausea but denies shortness of breath. Is not vaccinated for covid

## 2022-02-15 NOTE — DISCHARGE INSTRUCTIONS
Your Covid test is pending, it will be back within the next couple of hours.  I recommend logging into [employer].  The discharge instructions will have information on how to get set up with us if you do not already have it.    Otherwise recommend Tylenol, ibuprofen to help with body aches, sore throat.  There are over-the-counter decongestants like Robitussin that can help with the cough.

## 2022-02-15 NOTE — Clinical Note
Blanche was seen and treated in our emergency department on 2/15/2022.  She may return to school on 02/17/2022.  If COVID is negative    If you have any questions or concerns, please don't hesitate to call.      Diego Teresa MD

## 2022-02-15 NOTE — ED PROVIDER NOTES
EMERGENCY DEPARTMENT ENCOUNTER      NAME: Tushar Mancia  AGE: 21 year old female  YOB: 2000  MRN: [id number]  EVALUATION DATE & TIME: 2/15/2022  2:22 PM    PCP: Ashli Ref-Primary, Physician    ED PROVIDER: Diego Teresa M.D.      Chief Complaint   Patient presents with     Chest Pain     Cough         FINAL IMPRESSION:  1. Cough    2. Viral upper respiratory illness    3. Suspected 2019 novel coronavirus infection          ED COURSE & MEDICAL DECISION MAKING:    Pertinent Labs & Imaging studies reviewed. (See chart for details)  ED Course as of 02/15/22 1623   Tue Feb 15, 2022   1434 Patient is a 21-year-old woman here with symptoms consistent with viral upper respiratory infection versus pneumonia.  She has been taking NSAIDs, DuoNeb at home without much relief.  She is comfortable appearing here, normal respiratory rate and lung sounds.  Oxygenating at 99%.  She is afebrile, normal blood pressure.  She is not vaccinated against Covid, I ordered a Covid swab, chest x-ray to rule out pneumonia given the production nature of her cough.  If this is negative then be discharged with reassurance, symptomatic management.   1621 Chest x-ray is clear, Covid test is pending.  Discharged with information on viral upper respiratory infections, I instructed her to use NSAIDs, over-the-counter decongestants and she was given a school note.         Additional ED Course Timestamps:  2:28 PM Met with patient for initial interview and exam. Plan for care in the ED was discussed. PPE: N95, face shield, gloves.     At the conclusion of the encounter I discussed the results of all of the tests and the disposition. The questions were answered. The patient or family acknowledged understanding and was agreeable with the care plan.       MEDICATIONS GIVEN IN THE EMERGENCY:  Medications - No data to display      NEW PRESCRIPTIONS STARTED AT TODAY'S ER VISIT  New Prescriptions    No medications on file           =================================================================    HPI    Patient information was obtained from: Patient    Use of : N/A         Tushar Mancia is a 21 year old female with a pertinent history of asthma who presents to this ED for evaluation of cough and chest discomfort.     Starting 3 days ago patient developed sinus congestion, headache, sore throat, body aches and a productive cough with yellow sputum. Patient does not think she is pregnant. She tried her duo neb at home with no relief. Additionally, patient notes slight chest discomfort. Patient had covid during the summer last year, but is not immunized for Covid. She has been taking tylenol and ibuprofen for her symptoms.                REVIEW OF SYSTEMS   Review of Systems   Constitutional:        Endorses generalized body aches   HENT: Positive for congestion (sinus) and sore throat.    Respiratory: Positive for cough (productive with yellow sputum).    Cardiovascular:        Endorses slight chest discomfort   Neurological: Positive for headaches.   All other systems reviewed and are negative.      PAST MEDICAL HISTORY:  Past Medical History:   Diagnosis Date     Allergic state      Myasthenia gravis (H)      Uncomplicated asthma        PAST SURGICAL HISTORY:  Past Surgical History:   Procedure Laterality Date     BRONCHOSCOPY (RIGID OR FLEXIBLE), DIAGNOSTIC N/A 8/15/2018    Procedure: COMBINED BRONCHOSCOPY (RIGID OR FLEXIBLE), LAVAGE;  Flexible Bronchoscopy with Lavage (BAL) , Left Thoracoscopic Thymectomy ;  Surgeon: Vanessa Danielson MD;  [facility]     NO HISTORY OF SURGERY       THORACOSCOPIC THYMECTOMY Left 8/15/2018    Procedure: THORACOSCOPIC THYMECTOMY;;  Surgeon: Arias Estevez MD;  [facility] OR           CURRENT MEDICATIONS:    No current facility-administered medications for this encounter.     Current Outpatient Medications   Medication     albuterol (PROVENTIL) (2.5 MG/3ML) 0.083% neb  "solution     ORDER FOR DME, SET TO LOCAL PRINT,     pyridostigmine (MESTINON) 60 MG tablet       ALLERGIES:  No Known Allergies    FAMILY HISTORY:  Family History   Problem Relation Age of Onset     Other - See Comments Sister        SOCIAL HISTORY:   Social History     Socioeconomic History     Marital status: Single     Spouse name: Not on file     Number of children: Not on file     Years of education: Not on file     Highest education level: Not on file   Occupational History     Not on file   Tobacco Use     Smoking status: Never Smoker     Smokeless tobacco: Never Used   Substance and Sexual Activity     Alcohol use: No     Drug use: No     Sexual activity: Never   Other Topics Concern     Not on file   Social History Narrative    May 10, 2018.date:     Tushar lives with her parents two sisters and two brothers. They have no pets.     Tushar is in the 11th grade and does well in school. She works at [employer].     Dad is a registered nurse and Mom is a nursing assistant.     There are no significant stressors at home or school.      Social Determinants of Health     Financial Resource Strain: Not on file   Food Insecurity: Not on file   Transportation Needs: Not on file   Physical Activity: Not on file   Stress: Not on file   Social Connections: Not on file   Intimate Partner Violence: Not on file   Housing Stability: Not on file       VITALS:  /75   Pulse 98   Temp 98.8  F (37.1  C) (Oral)   Resp 16   Ht 1.676 m (5' 6\")   Wt 59 kg (130 lb)   LMP 01/25/2022   SpO2 99%   BMI 20.98 kg/m      PHYSICAL EXAM    Constitutional: Well developed, well nourished. Comfortable appearing.  HENT: Normocephalic, atraumatic, mucous membranes moist, nose normal. Neck- Supple, gross ROM intact.   Eyes: Pupils mid-range, conjunctiva without injection, no discharge.   Respiratory: Clear to auscultation bilaterally, no respiratory distress, no wheezing, speaks full sentences easily. No cough.  Cardiovascular: " Normal heart rate, regular rhythm, no murmurs. No pedal edema, 2+ DP pulses.   GI: Soft, no tenderness to deep palpation in all quadrants, no masses.  Musculoskeletal: Moving all 4 extremities intentionally and without pain. No obvious deformity.  Skin: Warm, dry, no rash.  Neurologic: Alert & oriented x 3, cranial nerves grossly intact.  Psychiatric: Affect normal, cooperative.       LAB:  All pertinent labs reviewed and interpreted.  Labs Ordered and Resulted from Time of ED Arrival to Time of ED Departure - No data to display    RADIOLOGY:  Reviewed all pertinent imaging. Please see official radiology report.  Chest XR,  PA & LAT   Final Result   IMPRESSION: Negative chest.  The lungs are clear and there are no pleural effusions. Normal heart size.          EKG:    All EKG interpretations will be found in ED course above.      I, Jackie Ríos am serving as a scribe to document services personally performed by Dr. Diego Teresa based on my observation and the provider's statements to me. I, Diego Teresa MD attest that Jackie Ríos is acting in a scribe capacity, has observed my performance of the services and has documented them in accordance with my direction.    Diego Teresa M.D.  Emergency Medicine  [facility] EMERGENCY ROOM  [address] [address] [phone]  Dept: [phone]       Diego Teresa MD  02/15/22 2699

## 2022-03-05 ENCOUNTER — HEALTH MAINTENANCE LETTER (OUTPATIENT)
Age: 22
End: 2022-03-05

## 2022-03-31 ENCOUNTER — HOSPITAL ENCOUNTER (EMERGENCY)
Facility: CLINIC | Age: 22
Discharge: HOME | End: 2022-03-31
Attending: EMERGENCY MEDICINE | Admitting: EMERGENCY MEDICINE
Payer: COMMERCIAL

## 2022-03-31 VITALS
SYSTOLIC BLOOD PRESSURE: 123 MMHG | DIASTOLIC BLOOD PRESSURE: 66 MMHG | WEIGHT: 130.73 LBS | BODY MASS INDEX: 21.1 KG/M2 | HEART RATE: 82 BPM | RESPIRATION RATE: 18 BRPM | OXYGEN SATURATION: 100 % | TEMPERATURE: 97.7 F

## 2022-03-31 DIAGNOSIS — R58 MULTIPLE ECCHYMOSES OF THIGH: ICD-10-CM

## 2022-03-31 LAB
BASOPHILS # BLD AUTO: 0 10E3/UL (ref 0–0.2)
BASOPHILS NFR BLD AUTO: 0 %
D DIMER PPP FEU-MCNC: <0.27 UG/ML FEU (ref 0–0.5)
EOSINOPHIL # BLD AUTO: 0.2 10E3/UL (ref 0–0.7)
EOSINOPHIL NFR BLD AUTO: 2 %
ERYTHROCYTE [DISTWIDTH] IN BLOOD BY AUTOMATED COUNT: 12 % (ref 10–15)
HCG SERPL QL: NEGATIVE
HCT VFR BLD AUTO: 41.1 % (ref 35–47)
HGB BLD-MCNC: 13.5 G/DL (ref 11.7–15.7)
IMM GRANULOCYTES # BLD: 0 10E3/UL
IMM GRANULOCYTES NFR BLD: 0 %
LYMPHOCYTES # BLD AUTO: 2.9 10E3/UL (ref 0.8–5.3)
LYMPHOCYTES NFR BLD AUTO: 37 %
MCH RBC QN AUTO: 30.3 PG (ref 26.5–33)
MCHC RBC AUTO-ENTMCNC: 32.8 G/DL (ref 31.5–36.5)
MCV RBC AUTO: 92 FL (ref 78–100)
MONOCYTES # BLD AUTO: 0.6 10E3/UL (ref 0–1.3)
MONOCYTES NFR BLD AUTO: 7 %
NEUTROPHILS # BLD AUTO: 4.1 10E3/UL (ref 1.6–8.3)
NEUTROPHILS NFR BLD AUTO: 54 %
NRBC # BLD AUTO: 0 10E3/UL
NRBC BLD AUTO-RTO: 0 /100
PLATELET # BLD AUTO: 329 10E3/UL (ref 150–450)
RBC # BLD AUTO: 4.46 10E6/UL (ref 3.8–5.2)
WBC # BLD AUTO: 7.8 10E3/UL (ref 4–11)

## 2022-03-31 PROCEDURE — 99283 EMERGENCY DEPT VISIT LOW MDM: CPT

## 2022-03-31 PROCEDURE — 36415 COLL VENOUS BLD VENIPUNCTURE: CPT | Performed by: EMERGENCY MEDICINE

## 2022-03-31 PROCEDURE — 85379 FIBRIN DEGRADATION QUANT: CPT | Performed by: EMERGENCY MEDICINE

## 2022-03-31 PROCEDURE — 84703 CHORIONIC GONADOTROPIN ASSAY: CPT | Performed by: EMERGENCY MEDICINE

## 2022-03-31 PROCEDURE — 85025 COMPLETE CBC W/AUTO DIFF WBC: CPT | Performed by: EMERGENCY MEDICINE

## 2022-04-01 ENCOUNTER — PATIENT OUTREACH (OUTPATIENT)
Dept: CARE COORDINATION | Facility: CLINIC | Age: 22
End: 2022-04-01
Payer: COMMERCIAL

## 2022-04-01 DIAGNOSIS — Z71.89 OTHER SPECIFIED COUNSELING: ICD-10-CM

## 2022-04-01 NOTE — DISCHARGE INSTRUCTIONS
Okay to use ice or heat to the thigh when it hurts.  Tylenol ibuprofen stay for pain.  We suspect some type of local bruising of the skin.  This should resolve with time.  We have performed a pregnancy test that was negative but if you have a missed.  Please test again.  Please follow-up with your regular doctor if rash progresses or if pain continues

## 2022-04-01 NOTE — PROGRESS NOTES
Clinic Care Coordination Contact  [facility]/Voicemail       Clinical Data: Care Coordinator Outreach  Outreach attempted x 1.  Left message on patient's voicemail with call back information and requested return call.  Plan: Care Coordinator will try to reach patient again in 1-2 business days.    Lory Carmichael  Community Health Worker  [facility]  [phone]

## 2022-04-01 NOTE — ED PROVIDER NOTES
History   Chief Complaint:  Rash and Leg Pain       HPI   Tushar Mancia is a 21 year old female with history of myasthenia gravis who presents with rash. The patient states that she has had a rash on her inner thighs for 2-3 days. She denies any known injury or trauma. Not on birth control.      Review of Systems   Skin: Positive for rash.   All other systems reviewed and are negative.      Allergies:  The patient does not have any allergies    Medications:  Levocetirizine  Doxycycline  Fluconazole  Albuterol  Pyridostigmine    Past Medical History:     Myasthenia gravis  Asthma  Brachial plexopathy  Depression  GERD  Ptosis of right eyelid      Past Surgical History:    Bronchoscopy  Thoracoscopic thymectomy     Family History:    Sister: mixed connective tissue disorder    Social History:  Presents alone    Physical Exam     Patient Vitals for the past 24 hrs:   BP Temp Temp src Pulse Resp SpO2 Weight   03/31/22 2118 -- 97.7  F (36.5  C) -- -- -- -- --   03/31/22 2117 123/66 -- Temporal 82 18 100 % 59.3 kg (130 lb 11.7 oz)       Physical Exam  Vitals and nursing note reviewed.   Cardiovascular:      Rate and Rhythm: Normal rate and regular rhythm.   Pulmonary:      Effort: Pulmonary effort is normal.      Breath sounds: Normal breath sounds.   Abdominal:      General: Abdomen is flat.      Palpations: Abdomen is soft.   Genitourinary:     Comments: On the inner aspects of both eyes there is small quarter to nickel sized areas of ecchymosis over the medial thigh.  There is no palpable cold there is no erythema there is no swelling.  Neurological:      Mental Status: She is alert.           Emergency Department Course     Laboratory:  Labs Ordered and Resulted from Time of ED Arrival to Time of ED Departure   D DIMER QUANTITATIVE - Normal       Result Value    D-Dimer Quantitative <0.27     HCG QUALITATIVE PREGNANCY - Normal    hCG Serum Qualitative Negative     CBC WITH PLATELETS AND DIFFERENTIAL    WBC  Count 7.8      RBC Count 4.46      Hemoglobin 13.5      Hematocrit 41.1      MCV 92      MCH 30.3      MCHC 32.8      RDW 12.0      Platelet Count 329      % Neutrophils 54      % Lymphocytes 37      % Monocytes 7      % Eosinophils 2      % Basophils 0      % Immature Granulocytes 0      NRBCs per 100 WBC 0      Absolute Neutrophils 4.1      Absolute Lymphocytes 2.9      Absolute Monocytes 0.6      Absolute Eosinophils 0.2      Absolute Basophils 0.0      Absolute Immature Granulocytes 0.0      Absolute NRBCs 0.0         Emergency Department Course:     Reviewed:  I reviewed nursing notes, vitals, past medical history and Care Everywhere    Assessments:  2210 I obtained history and examined the patient as noted above.    2300 I rechecked the patient and explained findings.    Disposition:  The patient was discharged to home.     Impression & Plan     Medical Decision Making:  Patient presents with vague thigh pain.  Patient has a history of myasthenia gravis but no other medical problems.  Examination shows very small areas of ecchymosis over the thigh suspect subcutaneous hematoma.  DVT was ruled out using D-dimer.  Platelet count is normal.  Recommend follow-up with primary care no emergent cause for thigh pain was identified and was discharged in stable condition.              Diagnosis:    ICD-10-CM    1. Multiple ecchymoses of thigh  R58        Discharge Medications:  Discharge Medication List as of 3/31/2022 11:06 PM          Scribe Disclosure:  I, Rodney Fuchs, am serving as a scribe at 10:10 PM on 3/31/2022 to document services personally performed by Peter Jolley MD based on my observations and the provider's statements to me.         Peter Jolley MD  04/05/22 3764

## 2022-04-01 NOTE — ED TRIAGE NOTES
Pt arrives to ED with a rash that she noted in spots on her body, but then focused on her inner thighs. Pt now c/o soreness to bilat thighs. Pt had unportected sex recently, not on BC, LMP a week ago. No fevers.

## 2022-04-02 NOTE — PROGRESS NOTES
Clinic Care Coordination Contact  [facility]       Clinical Data: Care Coordinator Outreach  Outreach attempted x 2.  Left message on patient's voicemail with call back information and requested return call.  Plan: Care Coordinator will try to reach patient again in 1-2 business days.    Radha Snow, [facility]  [phone]  [facility]

## 2022-05-02 ENCOUNTER — TELEPHONE (OUTPATIENT)
Dept: NEUROLOGY | Facility: CLINIC | Age: 22
End: 2022-05-02

## 2022-05-02 ENCOUNTER — OFFICE VISIT (OUTPATIENT)
Dept: NEUROLOGY | Facility: CLINIC | Age: 22
End: 2022-05-02
Payer: COMMERCIAL

## 2022-05-02 VITALS
HEART RATE: 76 BPM | DIASTOLIC BLOOD PRESSURE: 72 MMHG | SYSTOLIC BLOOD PRESSURE: 109 MMHG | OXYGEN SATURATION: 99 % | WEIGHT: 125 LBS | RESPIRATION RATE: 16 BRPM | BODY MASS INDEX: 20.18 KG/M2

## 2022-05-02 DIAGNOSIS — G70.00 MYASTHENIA GRAVIS (H): Primary | ICD-10-CM

## 2022-05-02 DIAGNOSIS — G70.01 MYASTHENIA GRAVIS WITH EXACERBATION (H): ICD-10-CM

## 2022-05-02 PROCEDURE — 99214 OFFICE O/P EST MOD 30 MIN: CPT | Mod: GC | Performed by: PSYCHIATRY & NEUROLOGY

## 2022-05-02 ASSESSMENT — PAIN SCALES - GENERAL: PAINLEVEL: NO PAIN (0)

## 2022-05-02 NOTE — PROGRESS NOTES
"             Pediatric Neurology Clinic      Tushar Mancia MRN# [id number]   YOB: 2000 Age: 21 year old      Date of Visit: May 2, 2022    Primary care provider: No Ref-Primary, Physician         Chief Complaint:     Follow up myasthenia gravis.       History is obtained from the patient and medical record       History of Present Illness:      Tushar Mancia is a 21 year old female who was seen and examined at the pediatric neurology clinic on May 2, 2022 for evaluation of myasthenia gravis, last seen in clinic in Nov 2021     Has noticed occasional drooping of the right eyelid which is slight improved but now her left eyelid as well, usually at the end of the day or when she is tired. DV will come and go, worse when tired, couple times a week. Will occasionally choke on saliva, but denies outright dysphagia (worse when sick). Notices her change in her speech more recently, some slurred words at the end of the day. Denies difficulty with chewing. Does have to take breaks when using arms for brushing teeth. Some weakness and shooting pains in the arms/legs. Overall feels pretty stable in the last 6 months.  Notes that everything has been improved in the last 3 years since her thymus was removed, but worsening in the last 6 months. With the cold notices her joints/muscles are more painful. Stopped Mestinon back in 2019, felt nauseous and headaches with a depressed mood.     She still is experiencing shortness of breath with exercise, fatigue (Vit D deficient - largely unchanged with replacement), and lightheadedness often in the AM or at the end of a long day. Has paid more attention to her diet which has been helpful. Has troubles with \"working out\" more from pain than weakness, but is harder to do basic exercises, located in the upper arms, described as soreness. 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 (3 years in - planned for 4.5 years) for psychology    MG Activities of Daily Living (MG-ADL) profile : score 7                 Past Medical History:     Past Medical History:   Diagnosis Date     Allergic state      Myasthenia gravis (H)      Uncomplicated asthma               Past Surgical History:     Past Surgical History:   Procedure Laterality Date     BRONCHOSCOPY (RIGID OR FLEXIBLE), DIAGNOSTIC N/A 8/15/2018    Procedure: COMBINED BRONCHOSCOPY (RIGID OR FLEXIBLE), LAVAGE;  Flexible Bronchoscopy with Lavage (BAL) , Left Thoracoscopic Thymectomy ;  Surgeon: Vanessa Danielson MD;  Location: [facility]     NO HISTORY OF SURGERY       THORACOSCOPIC THYMECTOMY Left 8/15/2018    Procedure: THORACOSCOPIC THYMECTOMY;;  Surgeon: Arias Estevez MD;  Location: [facility] OR             Social History:      Pediatric History   Patient Parents     Aman Callahan (Mother)     Blanche Shrestha (Father)     Other Topics Concern     Not on file   Social History Narrative    May 10, 2018.date:     Tushar lives with her parents two sisters and two brothers. They have no pets.     Tushar is in the 11th grade and does well in school. She works at [employer].     Dad is a registered nurse and Mom is a nursing assistant.     There are no significant stressors at home or school.             Family History:     Family History   Problem Relation Age of Onset     Other - See Comments Sister              Immunizations:     Immunization History   Administered Date(s) Administered     Comvax (HIB/HepB) 2001, 2001, 09/03/2002     DTAP (<7y) 2001, 2001, 03/05/2002, 11/27/2002, 08/30/2006     HEPA 05/06/2003, 05/31/2005     HPV 06/07/2013, 11/26/2013, 04/03/2014     Influenza (H1N1) 12/04/2009, 12/28/2009     Influenza Vaccine, 6+MO IM (QUADRIVALENT W/PRESERVATIVES) 11/27/2002, 11/07/2005, 12/04/2009, 09/22/2011, 11/26/2013     MMR 11/27/2002, 08/30/2006      Meningococcal (Menomune ) 06/07/2013     Pneumococcal (PCV 7) 2001, 2001, 03/05/2002     Poliovirus, inactivated (IPV) 2001, 2001, 03/05/2002, 08/30/2006     Tdap (Adacel,Boostrix) 06/07/2013     Typhoid IM 05/06/2003     Varicella 09/03/2002, 08/30/2006     Yellow Fever 05/06/2003            Allergies:    No Known Allergies          Medications:     Prescription Medications as of 5/2/2022       Rx Number Disp Refills Start End Last Dispensed Date Next Fill Date Owning Pharmacy    albuterol (PROVENTIL) (2.5 MG/3ML) 0.083% neb solution  60 mL 0 11/12/2021        Sig: Take 1 vial (2.5 mg) by nebulization every 4 hours as needed (cough)    Class: Local Print    Route: Nebulization    ORDER FOR DME, SET TO LOCAL PRINT,  1 Device 0 5/21/2015    [place], [place] - [address]    Sig: Equipment being ordered: knee brace    Class: Local Print         Not taking medications         Review of Systems:   The 10 point Review of Systems is negative other than noted in the HPI         Physical Exam:     /72   Pulse 76   Resp 16   Wt 56.7 kg (125 lb)   SpO2 99%   BMI 20.18 kg/m     Physical Exam:   Wt Readings from Last 4 Encounters:   05/02/22 56.7 kg (125 lb)   03/31/22 59.3 kg (130 lb 11.7 oz)   02/15/22 59 kg (130 lb)   11/12/21 59 kg (130 lb 1.1 oz)       General: NAD, cooperative on exam   Neurologic:   Mental Status Exam: Alert, awake and easily engaged in interaction.   Cranial Nerves: EOMs intact, no nystagmus, facial movements symmetric,                 facial sensation intact to light touch, hearing intact to conversation, palate and uvula               rise symmetrically, no deviation in uvula or tongue, tongue midline and fully mobile                with no atrophy or fasciculations.  Motor: Normal tone in all four extremities, no atrophy or fasciculations. No tremors.           Manual muscle examination: Neck flexion 4.5, shoulder  abduction 4.5, eld ext., flx, wrist flx and ext.  5/5, FDI 4/5; hip flexion 5/5, knee ext/flx 5, foot dorsiflx and plantar flx 5.  Myasthenia Gravis Worksheet    Test item None Mild Moderate Severe Score   Grade 0 1 2 3    Diplopia (lateral gaze), R or L, s 60 11-59 1-10 spontaneous 1   Ptosis (upward gaze), s 60 11-59 1-10 spontaneous 1   Facial weakness   (Eyelid closure) Normal Some resistence No resistance Incomplete 1   Dysarthria with counting 1-50 >50 30-49 10-29 9 0   Swallowing 4 oz water Normal mild  Severe (regurgitation) Not able    Right arm held outstretched at 90 deg, s 240  10-89 0-9 2   Left arm held outstretched at 90 deg, s 240  10-89 0-9 1    Vital capacity, % predicted 80 65-79 50-64 <50    Right hand , kgW, M/F 45/30 15-44/10-29 5-14/5-9 0-4/0-4 ND   Left hand , kgW, man/woman 35/25 15-34/10-24 5-14/5-9 0-4/0-4 ND   Head lift 45 deg supine, s 120  1-29 0 1(33)   Right leg held outstretched at 45 deg supine, s 100 31-99 1-30 0 2 (15)   Left leg held outstretched at 45 deg supine, s 100 31-99 1-30 0 2 (10)     Score 11 (previously 11)     Sensory: intact LT   Coordination: No overt dysmetria seen.    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 She has had progression of bulbar symptoms and some respiratory function, with obvious disease activity on examination. Discussed pros and cons of mestinon and other immunosuppressant medications. She has failed (intolerable side effects, questionable benefit) all oral medications trailed previously for symptoms control. Saw benefit in the past from IVIG during crisis, but prefers not to restart this medication if able. Discussed potential for long term  treatment with newer infusion therapies, specifically efgartigimod. Discussed dosing and side effects. She was in agreement that this would be a good medical option for treatment as she is still unable to hold a regular job and daily activities are being affected due to difficulties with symptom control.     Recommendations:  - Prior auth for efgartigimod  -  F/U in 2 months    Christine Russ MD  Neuromuscular Fellow    I personally examined this patient with the fellow Dr. Russ.  This note details our findings, and the impression and plan that we formulated together.  Our recommendations were discussed with the other providers and patient and/or family.   I have spent at least 30 min on the date of the encounter in chart review, patient visit, review of tests, counseling the patient, documentation about the issues documented above. See note for details.    Sincerely,        Obi Dave MD  Pediatric Neurology  [phone]           CC  Patient Care Team:  No Ref-Primary, Physician as PCP - Leda Bryant MD as MD (Pediatric Rheumatology)  Obi Dave MD as MD (Psychiatry & Neurology - Child & Adolescent Psychiatry)  Obi Dave MD as Assigned Neuroscience Provider  SELF, REFERRED    Copy to patient  WEI GRAHAMMANEMIGUEL  [address]  [facility] [address]

## 2022-05-02 NOTE — TELEPHONE ENCOUNTER
Tushar had an office visit with Dr. Dave and it was determined she would begin Vyvgart infusions.  She would like to infuse at [facility]. Therapy plan routed to Dr. Dave for signature.    Raquel Dang RN

## 2022-05-02 NOTE — NURSING NOTE
Chief Complaint   Patient presents with     RECHECK     RETURN MYASTHENIA GRAVIS - 6 month follow up     Doni Ruzi

## 2022-05-02 NOTE — LETTER
5/2/2022       RE: Tushar Mancia  [address]  [facility] [facility] [address]     Dear Colleague,    Thank you for referring your patient, Tushar Mancia, to the [facility] [place] at [facility]. Please see a copy of my visit note below.                 Pediatric Neurology Clinic      Tushar Mancia MRN# [id number]   YOB: 2000 Age: 21 year old      Date of Visit: May 2, 2022    Primary care provider: No Ref-Primary, Physician         Chief Complaint:     Follow up myasthenia gravis.       History is obtained from the patient and medical record       History of Present Illness:      Tushar Mancia is a 21 year old female who was seen and examined at the pediatric neurology clinic on May 2, 2022 for evaluation of myasthenia gravis, last seen in clinic in Nov 2021     Has noticed occasional drooping of the right eyelid which is slight improved but now her left eyelid as well, usually at the end of the day or when she is tired. DV will come and go, worse when tired, couple times a week. Will occasionally choke on saliva, but denies outright dysphagia (worse when sick). Notices her change in her speech more recently, some slurred words at the end of the day. Denies difficulty with chewing. Does have to take breaks when using arms for brushing teeth. Some weakness and shooting pains in the arms/legs. Overall feels pretty stable in the last 6 months.  Notes that everything has been improved in the last 3 years since her thymus was removed, but worsening in the last 6 months. With the cold notices her joints/muscles are more painful. Stopped Mestinon back in 2019, felt nauseous and headaches with a depressed mood.     She still is experiencing shortness of breath with exercise, fatigue (Vit D deficient - largely unchanged with replacement), and lightheadedness often in the AM or at the end of a long day. Has paid more  "attention to her diet which has been helpful. Has troubles with \"working out\" more from pain than weakness, but is harder to do basic exercises, located in the upper arms, described as soreness. 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 (3 years in - planned for 4.5 years) for psychology    MG Activities of Daily Living (MG-ADL) profile : score 7                 Past Medical History:     Past Medical History:   Diagnosis Date     Allergic state      Myasthenia gravis (H)      Uncomplicated asthma               Past Surgical History:     Past Surgical History:   Procedure Laterality Date     BRONCHOSCOPY (RIGID OR FLEXIBLE), DIAGNOSTIC N/A 8/15/2018    Procedure: COMBINED BRONCHOSCOPY (RIGID OR FLEXIBLE), LAVAGE;  Flexible Bronchoscopy with Lavage (BAL) , Left Thoracoscopic Thymectomy ;  Surgeon: Vanessa Danielson MD;  [facility]     NO HISTORY OF SURGERY       THORACOSCOPIC THYMECTOMY Left 8/15/2018    Procedure: THORACOSCOPIC THYMECTOMY;;  Surgeon: Arias Estevez MD;  [facility] OR             Social History:      Pediatric History   Patient Parents     Aman Callahan (Mother)     Blanche Shrestha (Father)     Other Topics Concern     Not on file   Social History Narrative    May 10, 2018.date:     Tushar lives with her parents two sisters and two brothers. They have no pets.     Tushar is in the 11th grade and does well in school. She works at [employer].     Dad is a registered nurse and Mom is a nursing assistant.     There are no significant stressors at home or school.             Family History:     Family History   Problem Relation Age of Onset     Other - See Comments Sister              Immunizations:     Immunization History   Administered Date(s) Administered     Comvax (HIB/HepB) 2001, 2001, 09/03/2002     DTAP (<7y) 2001, " 2001, 03/05/2002, 11/27/2002, 08/30/2006     HEPA 05/06/2003, 05/31/2005     HPV 06/07/2013, 11/26/2013, 04/03/2014     Influenza (H1N1) 12/04/2009, 12/28/2009     Influenza Vaccine, 6+MO IM (QUADRIVALENT W/PRESERVATIVES) 11/27/2002, 11/07/2005, 12/04/2009, 09/22/2011, 11/26/2013     MMR 11/27/2002, 08/30/2006     Meningococcal (Menomune ) 06/07/2013     Pneumococcal (PCV 7) 2001, 2001, 03/05/2002     Poliovirus, inactivated (IPV) 2001, 2001, 03/05/2002, 08/30/2006     Tdap (Adacel,Boostrix) 06/07/2013     Typhoid IM 05/06/2003     Varicella 09/03/2002, 08/30/2006     Yellow Fever 05/06/2003            Allergies:    No Known Allergies          Medications:     Prescription Medications as of 5/2/2022       Rx Number Disp Refills Start End Last Dispensed Date Next Fill Date Owning Pharmacy    albuterol (PROVENTIL) (2.5 MG/3ML) 0.083% neb solution  60 mL 0 11/12/2021        Sig: Take 1 vial (2.5 mg) by nebulization every 4 hours as needed (cough)    Class: Local Print    Route: Nebulization    ORDER FOR DME, SET TO LOCAL PRINT,  1 Device 0 5/21/2015    [facility] [place], [place] - [address], [address]    Sig: Equipment being ordered: knee brace    Class: Local Print         Not taking medications         Review of Systems:   The 10 point Review of Systems is negative other than noted in the HPI         Physical Exam:     /72   Pulse 76   Resp 16   Wt 56.7 kg (125 lb)   SpO2 99%   BMI 20.18 kg/m     Physical Exam:   Wt Readings from Last 4 Encounters:   05/02/22 56.7 kg (125 lb)   03/31/22 59.3 kg (130 lb 11.7 oz)   02/15/22 59 kg (130 lb)   11/12/21 59 kg (130 lb 1.1 oz)       General: NAD, cooperative on exam   Neurologic:   Mental Status Exam: Alert, awake and easily engaged in interaction.   Cranial Nerves: EOMs intact, no nystagmus, facial movements symmetric,                 facial sensation intact to light touch, hearing intact to  conversation, palate and uvula               rise symmetrically, no deviation in uvula or tongue, tongue midline and fully mobile                with no atrophy or fasciculations.  Motor: Normal tone in all four extremities, no atrophy or fasciculations. No tremors.           Manual muscle examination: Neck flexion 4.5, shoulder abduction 4.5, eld ext., flx, wrist flx and ext.  5/5, FDI 4/5; hip flexion 5/5, knee ext/flx 5, foot dorsiflx and plantar flx 5.  Myasthenia Gravis Worksheet    Test item None Mild Moderate Severe Score   Grade 0 1 2 3    Diplopia (lateral gaze), R or L, s 60 11-59 1-10 spontaneous 1   Ptosis (upward gaze), s 60 11-59 1-10 spontaneous 1   Facial weakness   (Eyelid closure) Normal Some resistence No resistance Incomplete 1   Dysarthria with counting 1-50 >50 30-49 10-29 9 0   Swallowing 4 oz water Normal mild  Severe (regurgitation) Not able    Right arm held outstretched at 90 deg, s 240  10-89 0-9 2   Left arm held outstretched at 90 deg, s 240  10-89 0-9 1    Vital capacity, % predicted 80 65-79 50-64 <50    Right hand , kgW, M/F 45/30 15-44/10-29 5-14/5-9 0-4/0-4 ND   Left hand , kgW, man/woman 35/25 15-34/10-24 5-14/5-9 0-4/0-4 ND   Head lift 45 deg supine, s 120  1-29 0 1(33)   Right leg held outstretched at 45 deg supine, s 100 31-99 1-30 0 2 (15)   Left leg held outstretched at 45 deg supine, s 100 31-99 1-30 0 2 (10)     Score 11 (previously 11)     Sensory: intact LT   Coordination: No overt dysmetria seen.    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 She has had progression of bulbar symptoms and some respiratory function, with obvious disease activity on examination.  Discussed pros and cons of mestinon and other immunosuppressant medications. She has failed (intolerable side effects, questionable benefit) all oral medications trailed previously for symptoms control. Saw benefit in the past from IVIG during crisis, but prefers not to restart this medication if able. Discussed potential for long term treatment with newer infusion therapies, specifically efgartigimod. Discussed dosing and side effects. She was in agreement that this would be a good medical option for treatment as she is still unable to hold a regular job and daily activities are being affected due to difficulties with symptom control.     Recommendations:  - Prior auth for efgartigimod  -  F/U in 2 months    Christine Russ MD  Neuromuscular Fellow    I personally examined this patient with the fellow Dr. Russ.  This note details our findings, and the impression and plan that we formulated together.  Our recommendations were discussed with the other providers and patient and/or family.   I have spent at least 30 min on the date of the encounter in chart review, patient visit, review of tests, counseling the patient, documentation about the issues documented above. See note for details.    Sincerely,        Obi Dave MD  Pediatric Neurology  [phone]             Patient Care Team:  No Ref-Primary, Physician as PCP - Leda Bryant MD as MD (Pediatric Rheumatology)  Obi Dave MD as MD (Psychiatry & Neurology - Child & Adolescent Psychiatry)  Obi Dave MD as Assigned Neuroscience Provider  SELF, REFERRED    Copy to patient  WEI GIBSON  [address]  [facility] [place] [address]      Sincerely,    Obi Dave MD

## 2022-05-03 RX ORDER — EPINEPHRINE 1 MG/ML
0.3 INJECTION, SOLUTION, CONCENTRATE INTRAVENOUS EVERY 5 MIN PRN
Status: CANCELLED | OUTPATIENT
Start: 2022-05-03

## 2022-05-03 RX ORDER — DIPHENHYDRAMINE HYDROCHLORIDE 50 MG/ML
50 INJECTION INTRAMUSCULAR; INTRAVENOUS
Status: CANCELLED
Start: 2022-05-03

## 2022-05-03 RX ORDER — NALOXONE HYDROCHLORIDE 0.4 MG/ML
0.2 INJECTION, SOLUTION INTRAMUSCULAR; INTRAVENOUS; SUBCUTANEOUS
Status: CANCELLED | OUTPATIENT
Start: 2022-05-03

## 2022-05-03 RX ORDER — ALBUTEROL SULFATE 0.83 MG/ML
2.5 SOLUTION RESPIRATORY (INHALATION)
Status: CANCELLED | OUTPATIENT
Start: 2022-05-03

## 2022-05-03 RX ORDER — HEPARIN SODIUM (PORCINE) LOCK FLUSH IV SOLN 100 UNIT/ML 100 UNIT/ML
5 SOLUTION INTRAVENOUS
Status: CANCELLED | OUTPATIENT
Start: 2022-05-03

## 2022-05-03 RX ORDER — ALBUTEROL SULFATE 90 UG/1
1-2 AEROSOL, METERED RESPIRATORY (INHALATION)
Status: CANCELLED
Start: 2022-05-03

## 2022-05-03 RX ORDER — METHYLPREDNISOLONE SODIUM SUCCINATE 125 MG/2ML
125 INJECTION, POWDER, LYOPHILIZED, FOR SOLUTION INTRAMUSCULAR; INTRAVENOUS
Status: CANCELLED
Start: 2022-05-03

## 2022-05-03 RX ORDER — MEPERIDINE HYDROCHLORIDE 25 MG/ML
25 INJECTION INTRAMUSCULAR; INTRAVENOUS; SUBCUTANEOUS EVERY 30 MIN PRN
Status: CANCELLED | OUTPATIENT
Start: 2022-05-03

## 2022-05-03 RX ORDER — HEPARIN SODIUM,PORCINE 10 UNIT/ML
5 VIAL (ML) INTRAVENOUS
Status: CANCELLED | OUTPATIENT
Start: 2022-05-03

## 2022-05-05 ENCOUNTER — TELEPHONE (OUTPATIENT)
Dept: NEUROLOGY | Facility: CLINIC | Age: 22
End: 2022-05-05
Payer: COMMERCIAL

## 2022-05-05 NOTE — TELEPHONE ENCOUNTER
Prior Authorization Infusion/Clinic Administered Request    Location: [facility]  Diagnosis and ICD:Myasthenia Gravis, G70.01   Drug/Therapy: Vyvgart 10 mg/kg x 7    Previously Tried and Failed Therapies: prednisone, pyridostigimine    Date of provider note with supporting information: 5/2/22    Urgency (When is the patient scheduled?):     Would you like to include any research articles?         If yes please call [phone] for further instructions about sending that information

## 2022-05-17 ENCOUNTER — TELEPHONE (OUTPATIENT)
Dept: NEUROLOGY | Facility: CLINIC | Age: 22
End: 2022-05-17
Payer: COMMERCIAL

## 2022-05-18 ENCOUNTER — TELEPHONE (OUTPATIENT)
Dept: NEUROLOGY | Facility: CLINIC | Age: 22
End: 2022-05-18
Payer: COMMERCIAL

## 2022-06-03 ENCOUNTER — HOSPITAL ENCOUNTER (INPATIENT)
Facility: CLINIC | Age: 22
LOS: 4 days | Discharge: HOME | DRG: 057 | End: 2022-06-07
Attending: ORTHOPAEDIC SURGERY | Admitting: PEDIATRICS
Payer: COMMERCIAL

## 2022-06-03 ENCOUNTER — TELEPHONE (OUTPATIENT)
Dept: NEUROLOGY | Facility: CLINIC | Age: 22
End: 2022-06-03
Payer: COMMERCIAL

## 2022-06-03 DIAGNOSIS — R69 CHRONIC ILLNESS: ICD-10-CM

## 2022-06-03 DIAGNOSIS — G70.01 MYASTHENIA GRAVIS WITH EXACERBATION (H): Primary | ICD-10-CM

## 2022-06-03 PROBLEM — G70.00 MYASTHENIA GRAVIS (H): Status: ACTIVE | Noted: 2018-07-23

## 2022-06-03 LAB — SARS-COV-2 RNA RESP QL NAA+PROBE: NEGATIVE

## 2022-06-03 PROCEDURE — 250N000011 HC RX IP 250 OP 636

## 2022-06-03 PROCEDURE — 99222 1ST HOSP IP/OBS MODERATE 55: CPT | Mod: AI | Performed by: PEDIATRICS

## 2022-06-03 PROCEDURE — 250N000013 HC RX MED GY IP 250 OP 250 PS 637

## 2022-06-03 PROCEDURE — 30233S1 TRANSFUSION OF NONAUTOLOGOUS GLOBULIN INTO PERIPHERAL VEIN, PERCUTANEOUS APPROACH: ICD-10-PCS

## 2022-06-03 PROCEDURE — 999N000104 HC STATISTIC NO CHARGE

## 2022-06-03 PROCEDURE — 87635 SARS-COV-2 COVID-19 AMP PRB: CPT

## 2022-06-03 PROCEDURE — 120N000007 HC R&B PEDS UMMC

## 2022-06-03 RX ORDER — ONDANSETRON 4 MG/1
4 TABLET, ORALLY DISINTEGRATING ORAL EVERY 6 HOURS PRN
Status: DISCONTINUED | OUTPATIENT
Start: 2022-06-03 | End: 2022-06-07 | Stop reason: HOSPADM

## 2022-06-03 RX ORDER — SODIUM CHLORIDE 9 MG/ML
INJECTION, SOLUTION INTRAVENOUS CONTINUOUS PRN
Status: CANCELLED | OUTPATIENT
Start: 2022-06-03

## 2022-06-03 RX ORDER — DIPHENHYDRAMINE HYDROCHLORIDE 50 MG/ML
50 INJECTION INTRAMUSCULAR; INTRAVENOUS
Status: DISCONTINUED | OUTPATIENT
Start: 2022-06-03 | End: 2022-06-07 | Stop reason: HOSPADM

## 2022-06-03 RX ORDER — METRONIDAZOLE 500 MG/1
500 TABLET ORAL 2 TIMES DAILY
Status: COMPLETED | OUTPATIENT
Start: 2022-06-03 | End: 2022-06-07

## 2022-06-03 RX ORDER — ACETAMINOPHEN 325 MG/1
650 TABLET ORAL SEE ADMIN INSTRUCTIONS
Status: CANCELLED | OUTPATIENT
Start: 2022-06-03

## 2022-06-03 RX ORDER — SODIUM CHLORIDE 9 MG/ML
INJECTION, SOLUTION INTRAVENOUS CONTINUOUS PRN
Status: DISCONTINUED | OUTPATIENT
Start: 2022-06-03 | End: 2022-06-07 | Stop reason: HOSPADM

## 2022-06-03 RX ORDER — ALBUTEROL SULFATE 90 UG/1
1-2 AEROSOL, METERED RESPIRATORY (INHALATION)
Status: DISCONTINUED | OUTPATIENT
Start: 2022-06-03 | End: 2022-06-07 | Stop reason: HOSPADM

## 2022-06-03 RX ORDER — ALBUTEROL SULFATE 0.83 MG/ML
2.5 SOLUTION RESPIRATORY (INHALATION)
Status: DISCONTINUED | OUTPATIENT
Start: 2022-06-03 | End: 2022-06-07 | Stop reason: HOSPADM

## 2022-06-03 RX ORDER — ALBUTEROL SULFATE 90 UG/1
1-2 AEROSOL, METERED RESPIRATORY (INHALATION)
Status: CANCELLED | OUTPATIENT
Start: 2022-06-03

## 2022-06-03 RX ORDER — ACETAMINOPHEN 325 MG/1
650 TABLET ORAL EVERY 6 HOURS PRN
Status: DISCONTINUED | OUTPATIENT
Start: 2022-06-03 | End: 2022-06-07 | Stop reason: HOSPADM

## 2022-06-03 RX ORDER — ACETAMINOPHEN 325 MG/1
650 TABLET ORAL SEE ADMIN INSTRUCTIONS
Status: DISCONTINUED | OUTPATIENT
Start: 2022-06-03 | End: 2022-06-07 | Stop reason: HOSPADM

## 2022-06-03 RX ORDER — ALBUTEROL SULFATE 0.83 MG/ML
2.5 SOLUTION RESPIRATORY (INHALATION) EVERY 4 HOURS PRN
Status: DISCONTINUED | OUTPATIENT
Start: 2022-06-03 | End: 2022-06-07 | Stop reason: HOSPADM

## 2022-06-03 RX ORDER — DIPHENHYDRAMINE HYDROCHLORIDE 50 MG/ML
50 INJECTION INTRAMUSCULAR; INTRAVENOUS
Status: CANCELLED | OUTPATIENT
Start: 2022-06-03

## 2022-06-03 RX ORDER — ALBUTEROL SULFATE 0.83 MG/ML
2.5 SOLUTION RESPIRATORY (INHALATION)
Status: CANCELLED | OUTPATIENT
Start: 2022-06-03

## 2022-06-03 RX ORDER — DIPHENHYDRAMINE HCL 25 MG
1 CAPSULE ORAL SEE ADMIN INSTRUCTIONS
Status: DISCONTINUED | OUTPATIENT
Start: 2022-06-03 | End: 2022-06-07 | Stop reason: HOSPADM

## 2022-06-03 RX ADMIN — ONDANSETRON 4 MG: 4 TABLET, ORALLY DISINTEGRATING ORAL at 23:58

## 2022-06-03 RX ADMIN — METRONIDAZOLE 500 MG: 500 TABLET ORAL at 22:08

## 2022-06-03 RX ADMIN — HUMAN IMMUNOGLOBULIN G 20 G: 20 LIQUID INTRAVENOUS at 22:56

## 2022-06-03 RX ADMIN — DIPHENHYDRAMINE HYDROCHLORIDE 50 MG: 25 CAPSULE ORAL at 22:08

## 2022-06-03 RX ADMIN — ACETAMINOPHEN 650 MG: 325 TABLET, FILM COATED ORAL at 22:09

## 2022-06-03 ASSESSMENT — ACTIVITIES OF DAILY LIVING (ADL)
ADLS_ACUITY_SCORE: 35
ADLS_ACUITY_SCORE: 35

## 2022-06-03 NOTE — H&P
[facility]    History and Physical - Pediatric Service        Date of Admission:  Isi 3, 2022    Assessment & Plan      Tushar Mancia is a 21 year old female admitted on Isi 3, 2022. She has a history of myasthenia gravis and follows with Dr. Dave and is admitted for a myasthenia gravis exacerbation with the need for IVIG administration.    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 Per a recent clinic note with Dr. Dave she has had progression of bulbar symptoms and some respiratory function. At the time they discussed pros and cons of mestinon and other immunosuppressant medications. Previously saw benefit in the past from IVIG. They also discussed trying efgartigimod (Vyvgart) but her prior authorization was denied. As a result patient was accepted by the [facility] team from Dr. Dave for admission for IVIG administration. Previously had mild reaction to the administration including fever and nausea during a 4 day course of IVIG. Plan to follow similar protocol this admission.  - Consult neurology, appreciate recommendations  - IVIG 20 g Q24H for 4 doses, monitoring per protocol  - Pre-medication with Tylenol and Benadryl   - Albuterol PRN  - Zofran PRN    #Bacterial Vaginosis  Recently diagnosed as an outpatient [facility] on 5/31 to complete a 7 day course.  - Continue PTA Flagyl 500 mg BID for 8 remaining doses.     Diet: Peds Diet Age 9-18 yrs  DVT Prophylaxis: Low Risk/Ambulatory with no VTE prophylaxis indicated  Storey Catheter: Not present  Fluids: N/A  Central Lines: None  Cardiac Monitoring: None  Code Status:   Full    Clinically Significant Risk Factors Present on Admission                      Disposition Plan   Expected Discharge:    Anticipated discharge location:  Awaiting care  coordination huddle  Delays:          The patient's care was discussed with the Attending Physician, Dr. Garcia.    Jean Marie Quigley MD  Pediatric Service   [facility]  Securely message with the Clip Web Console (learn more here)  Text page via [facility] Paging/Directory   ______________________________________________________________________    Chief Complaint   Weakness    History is obtained from the patient    History of Present Illness   Tushar Mancia is a 21 year old female who has a history of Myasthenia Gravis and is admitted for MG exacerbation and IVIG administration.    Patient recently evaluated by Dr. Dave in clinic for worsening of MG symptoms and hope was to start Vyvgart as patient was hesitant to start immunosuppressant medications chronically. PA for Vyvgart was denied and patient's symptoms continued to not improve. Patient spoke with Dr. Dave and through shared decision making patient opted to be admitted to have IVIG administered.     Patient notes weakness and eyelid drooping at the end of the day with some double vision and SOB on some days with increased exertion.     Recently began treatment for bacterial vaginosis. She has had no fevers, chills, cough, diarrhea, vomiting, or syncope.    Review of Systems    The 10 point Review of Systems is negative other than noted in the HPI or here.    Past Medical History    I have reviewed this patient's medical history and updated it with pertinent information if needed.   Past Medical History:   Diagnosis Date     Allergic state      Myasthenia gravis (H)      Uncomplicated asthma       Past Surgical History   I have reviewed this patient's surgical history and updated it with pertinent information if needed.  Past Surgical History:   Procedure Laterality Date     BRONCHOSCOPY (RIGID OR FLEXIBLE), DIAGNOSTIC N/A 8/15/2018    Procedure: COMBINED BRONCHOSCOPY (RIGID OR FLEXIBLE), LAVAGE;   Flexible Bronchoscopy with Lavage (BAL) , Left Thoracoscopic Thymectomy ;  Surgeon: Vanessa Danielson MD;  Location: [facility] OR     NO HISTORY OF SURGERY       THORACOSCOPIC THYMECTOMY Left 8/15/2018    Procedure: THORACOSCOPIC THYMECTOMY;;  Surgeon: Arias Estevez MD;  Location: [facility] OR     Social History   I have reviewed this patient's social history and updated it with pertinent information if needed. Tushar Mancia  reports that she has never smoked. She has never used smokeless tobacco. She reports that she does not drink alcohol and does not use drugs.    Family History   I have reviewed this patient's family history and updated it with pertinent information if needed.  Family History   Problem Relation Age of Onset     Other - See Comments Sister        Prior to Admission Medications   Prior to Admission Medications   Prescriptions Last Dose Informant Patient Reported? Taking?   ORDER FOR DME, SET TO LOCAL PRINT,   No No   Sig: Equipment being ordered: knee brace   albuterol (PROVENTIL) (2.5 MG/3ML) 0.083% neb solution   No No   Sig: Take 1 vial (2.5 mg) by nebulization every 4 hours as needed (cough)      Facility-Administered Medications: None     Allergies   No Known Allergies    Physical Exam   Vital Signs: Temp: 98.1  F (36.7  C) Temp src: Oral BP: 109/73 Pulse: 74   Resp: 18 SpO2: 100 %      Weight: 128 lbs 4.92 oz    Constitutional: awake, alert, cooperative, no apparent distress, and appears stated age  Eyes: Lids and lashes normal, pupils equal, round and reactive to light, extra ocular muscles intact, sclera clear, conjunctiva normal  ENT: Normocephalic, without obvious abnormality, atraumatic  Respiratory: No increased work of breathing, good air exchange, clear to auscultation bilaterally, no crackles or wheezing  Cardiovascular: Normal apical impulse, regular rate and rhythm, normal S1 and S2, no S3 or S4, and no murmur noted  GI: No scars, normal bowel sounds, soft, non-distended,  non-tender, no masses palpated, no hepatosplenomegally  Skin: normal skin color, texture, turgor  Musculoskeletal: There is no redness, warmth, or swelling of the joints.  Full range of motion noted.  Motor strength is 5 out of 5 all extremities bilaterally.  Tone is normal.  Neurologic: Awake, alert, oriented to name, place and time.  Cranial nerves II-XII are grossly intact.  Motor is 5 out of 5 bilaterally. No focal deficits.  Neuropsychiatric: Euthymic.    Data   Data reviewed today: I reviewed all medications, new labs and imaging results over the last 24 hours. I personally reviewed no images or EKG's today.    No lab results found in last 7 days.

## 2022-06-04 PROCEDURE — 250N000013 HC RX MED GY IP 250 OP 250 PS 637

## 2022-06-04 PROCEDURE — 94010 BREATHING CAPACITY TEST: CPT

## 2022-06-04 PROCEDURE — 999N000157 HC STATISTIC RCP TIME EA 10 MIN

## 2022-06-04 PROCEDURE — 99221 1ST HOSP IP/OBS SF/LOW 40: CPT | Performed by: PSYCHIATRY & NEUROLOGY

## 2022-06-04 PROCEDURE — 99232 SBSQ HOSP IP/OBS MODERATE 35: CPT | Mod: GC | Performed by: ORTHOPAEDIC SURGERY

## 2022-06-04 PROCEDURE — 94150 VITAL CAPACITY TEST: CPT

## 2022-06-04 PROCEDURE — 99207 PR SC NO CHARGE VISIT: CPT | Performed by: PSYCHIATRY & NEUROLOGY

## 2022-06-04 PROCEDURE — 120N000007 HC R&B PEDS UMMC

## 2022-06-04 RX ADMIN — METRONIDAZOLE 500 MG: 500 TABLET ORAL at 21:08

## 2022-06-04 RX ADMIN — METRONIDAZOLE 500 MG: 500 TABLET ORAL at 08:13

## 2022-06-04 RX ADMIN — ACETAMINOPHEN 650 MG: 325 TABLET, FILM COATED ORAL at 03:12

## 2022-06-04 RX ADMIN — ACETAMINOPHEN 650 MG: 325 TABLET, FILM COATED ORAL at 22:00

## 2022-06-04 RX ADMIN — DIPHENHYDRAMINE HYDROCHLORIDE 50 MG: 25 CAPSULE ORAL at 23:07

## 2022-06-04 ASSESSMENT — ACTIVITIES OF DAILY LIVING (ADL)
CONCENTRATING,_REMEMBERING_OR_MAKING_DECISIONS_DIFFICULTY: NO
CHANGE_IN_FUNCTIONAL_STATUS_SINCE_ONSET_OF_CURRENT_ILLNESS/INJURY: NO
ADLS_ACUITY_SCORE: 18
HEARING_DIFFICULTY_OR_DEAF: NO
ADLS_ACUITY_SCORE: 20
DOING_ERRANDS_INDEPENDENTLY_DIFFICULTY: NO
ADLS_ACUITY_SCORE: 20
DRESSING/BATHING_DIFFICULTY: NO
ADLS_ACUITY_SCORE: 20
WALKING_OR_CLIMBING_STAIRS_DIFFICULTY: NO
ADLS_ACUITY_SCORE: 20
WEAR_GLASSES_OR_BLIND: NO
ADLS_ACUITY_SCORE: 20
FALL_HISTORY_WITHIN_LAST_SIX_MONTHS: NO
DIFFICULTY_EATING/SWALLOWING: NO
ADLS_ACUITY_SCORE: 20
TOILETING_ISSUES: NO
ADLS_ACUITY_SCORE: 18
DIFFICULTY_COMMUNICATING: NO
ADLS_ACUITY_SCORE: 20
ADLS_ACUITY_SCORE: 18
ADLS_ACUITY_SCORE: 20
ADLS_ACUITY_SCORE: 18

## 2022-06-04 NOTE — CONSULTS
Pediatric Neurology Inpatient Consult    Patient name: Tushar Mancia  Patient YOB: 2000  Medical record number: [id number]    Date of consult: June 4, 2022    Requesting provider: Joss Santos*    Chief complaint: antibody positive myasthenia gravis    History of Present Illness:    Tushar Mancia is a 21 year old female seen in consultation at the request of Joss Santos* for the above.  .  Tushar Mancia has the following relevant neurological history:     Myasthenia gravis s/p thymectomy    Tushar is accompanied by her mother.  She notes she has had worsening fatigable ptosis, some dysphagia, and difficulty with extremity strength.  This gets worse as the day goes on.  The symptoms can wax and wane.  She denies diplopia currently, but does get it if she is very tired.  She denies shortness of breath.  She used to take mestinon and it helped.  She did not like the side effects.  It caused her nausea.  She was on prednisone previously.  After her thymectomy she went quite some time without symptoms (over a year).  She previously has responded well to IVIg.  She notes sometimes it is hard to tell if she is weak, versus just tired.    Past Medical History:   Diagnosis Date     Allergic state      Myasthenia gravis (H)      Uncomplicated asthma        Past Surgical History:   Procedure Laterality Date     BRONCHOSCOPY (RIGID OR FLEXIBLE), DIAGNOSTIC N/A 8/15/2018    Procedure: COMBINED BRONCHOSCOPY (RIGID OR FLEXIBLE), LAVAGE;  Flexible Bronchoscopy with Lavage (BAL) , Left Thoracoscopic Thymectomy ;  Surgeon: Vanessa Danielson MD;  [facility]     NO HISTORY OF SURGERY       THORACOSCOPIC THYMECTOMY Left 8/15/2018    Procedure: THORACOSCOPIC THYMECTOMY;;  Surgeon: Arias Estevez MD;  [facility]       Social History     Social History Narrative    May 10, 2018.date:     Tushar lives with her parents two sisters and two  "brothers. They have no pets.     Tushar is in the 11th grade and does well in school. She works at [employer].     Dad is a registered nurse and Mom is a nursing assistant.     There are no significant stressors at home or school.        Current Facility-Administered Medications   Medication     acetaminophen (TYLENOL) tablet 650 mg     acetaminophen (TYLENOL) tablet 650 mg     albuterol (PROVENTIL HFA/VENTOLIN HFA) inhaler    Or     albuterol (PROVENTIL) neb solution 2.5 mg     albuterol (PROVENTIL) neb solution 2.5 mg     diphenhydrAMINE (BENADRYL) capsule 50 mg     diphenhydrAMINE (BENADRYL) injection 50 mg     EPINEPHrine (ADRENALIN) kit 0.3 mg     immune globulin - sucrose free 10 % injection 20 g     MEDICATION INSTRUCTIONS-Stop infusion if hypersensitivity reaction occurs     methylPREDNISolone sodium succinate (solu-MEDROL) pediatric injection 120 mg     metroNIDAZOLE (FLAGYL) tablet 500 mg     ondansetron (ZOFRAN ODT) ODT tab 4 mg     sodium chloride 0.9% infusion       No Known Allergies    Family History   Problem Relation Age of Onset     Other - See Comments Sister          Social History:     Review of Systems: A comprehensive 14 point ROS is reviewed and otherwise negative/noncontributory except as mentioned in HPI.    Objective:     /70   Pulse 68   Temp 97.2  F (36.2  C) (Oral)   Resp 18   Ht 1.645 m (5' 4.76\")   Wt 58.2 kg (128 lb 4.9 oz)   SpO2 98%   BMI 21.51 kg/m      Gen: The patient is awake and alert; comfortable and in no acute distress  EYES: Pupils equal round and reactive to light. Extraocular movements intact with spontaneous conjugate gaze.   RESP: No increased work of breathing  Extremities: warm and well perfused without cyanosis or clubbing  Skin: No rash appreciated. No relevant birth marks    I completed a thorough neurological exam including:   This exam was notable for the following pertinent postivies: no ptosis or diplopia, reduced counting on exhalation to " 24, normal repetitive strength in UE's, but hip flexors are 4/5 and fatigable      Assessment and Plan:     Tushar Mancia is a 21 year old female with the following relevant neurological history:     Antibody positive myasthenia with exaccerbation        Plan:     Continue ivig per Dr. Dave's recommendations  Please obtain daily NIF's for monitoring of respiratory weakness    - This patient's case and my recommendations were discussed with Joss Santos* or the covering colleague.    Cesar Guaman MD  Pediatric Neurology

## 2022-06-04 NOTE — PLAN OF CARE
Goal Outcome Evaluation:    2000-2330:  Patient was a direct admit from home for IVIG infusion related to Myasthenia Gravis flare up.  Afebrile, VSS, denies pain.  PIV started, patient pre-medicated with tylenol and benadryl and IVIG initiated at 2300.  Patient complaining of some nausea about 45 minutes into the infusion- zofran administered x 1.  Boyfriend at bedside supportive of patient.  Patient calm and cooperative with cares and updated on plan of care.

## 2022-06-04 NOTE — PLAN OF CARE
Goal Outcome Evaluation:    0211-4737: Pt was on step 1 of IVIG infusion at start of this nurses shift. Afebrile throughout shift. Pt reported feeling nauseous about 1 hour into infusion. PRN zofran administered x 1. Near end of infusion pt reported feeling a mild, dull, ache in her upper and lower extremities. PRN tylenol given x 1 with pt reporting pain relief. Boyfriend remains at bedside, pleasant, interactive, and attentive to pt needs. Pt updated on POC. Continue to monitor.

## 2022-06-04 NOTE — PLAN OF CARE
Goal Outcome Evaluation:  Afebrile VSS. Pt reported weakness in her arms but this has been unchanged since admission-team aware. Pt was cleared to walk around the unit. Mom and boyfriend have visited. Pt will get another dose of IVIG tonight. Pt is currently with boyfriend.

## 2022-06-05 PROCEDURE — 258N000003 HC RX IP 258 OP 636: Performed by: STUDENT IN AN ORGANIZED HEALTH CARE EDUCATION/TRAINING PROGRAM

## 2022-06-05 PROCEDURE — 999N000040 HC STATISTIC CONSULT NO CHARGE VASC ACCESS

## 2022-06-05 PROCEDURE — 258N000003 HC RX IP 258 OP 636

## 2022-06-05 PROCEDURE — 250N000012 HC RX MED GY IP 250 OP 636 PS 637

## 2022-06-05 PROCEDURE — 94150 VITAL CAPACITY TEST: CPT

## 2022-06-05 PROCEDURE — 99232 SBSQ HOSP IP/OBS MODERATE 35: CPT | Performed by: ORTHOPAEDIC SURGERY

## 2022-06-05 PROCEDURE — 250N000011 HC RX IP 250 OP 636

## 2022-06-05 PROCEDURE — 250N000013 HC RX MED GY IP 250 OP 250 PS 637: Performed by: ORTHOPAEDIC SURGERY

## 2022-06-05 PROCEDURE — 250N000013 HC RX MED GY IP 250 OP 250 PS 637

## 2022-06-05 PROCEDURE — 999N000007 HC SITE CHECK

## 2022-06-05 PROCEDURE — 120N000007 HC R&B PEDS UMMC

## 2022-06-05 PROCEDURE — 999N000127 HC STATISTIC PERIPHERAL IV START W US GUIDANCE

## 2022-06-05 RX ORDER — SODIUM CHLORIDE 9 MG/ML
INJECTION, SOLUTION INTRAVENOUS
Status: DISPENSED
Start: 2022-06-05 | End: 2022-06-06

## 2022-06-05 RX ORDER — SUMATRIPTAN 6 MG/.5ML
6 INJECTION, SOLUTION SUBCUTANEOUS ONCE
Status: COMPLETED | OUTPATIENT
Start: 2022-06-05 | End: 2022-06-05

## 2022-06-05 RX ORDER — IBUPROFEN 600 MG/1
600 TABLET, FILM COATED ORAL EVERY 4 HOURS PRN
Status: DISCONTINUED | OUTPATIENT
Start: 2022-06-05 | End: 2022-06-05

## 2022-06-05 RX ORDER — IBUPROFEN 600 MG/1
600 TABLET, FILM COATED ORAL EVERY 6 HOURS PRN
Status: DISCONTINUED | OUTPATIENT
Start: 2022-06-05 | End: 2022-06-05

## 2022-06-05 RX ORDER — ONDANSETRON 2 MG/ML
4 INJECTION INTRAMUSCULAR; INTRAVENOUS ONCE
Status: COMPLETED | OUTPATIENT
Start: 2022-06-06 | End: 2022-06-06

## 2022-06-05 RX ORDER — IBUPROFEN 600 MG/1
600 TABLET, FILM COATED ORAL EVERY 6 HOURS PRN
Status: DISCONTINUED | OUTPATIENT
Start: 2022-06-05 | End: 2022-06-07 | Stop reason: HOSPADM

## 2022-06-05 RX ORDER — LIDOCAINE 40 MG/G
CREAM TOPICAL
Status: DISCONTINUED | OUTPATIENT
Start: 2022-06-05 | End: 2022-06-07 | Stop reason: HOSPADM

## 2022-06-05 RX ADMIN — IBUPROFEN 600 MG: 600 TABLET, FILM COATED ORAL at 12:08

## 2022-06-05 RX ADMIN — DIPHENHYDRAMINE HYDROCHLORIDE 50 MG: 25 CAPSULE ORAL at 19:58

## 2022-06-05 RX ADMIN — ACETAMINOPHEN 650 MG: 325 TABLET, FILM COATED ORAL at 16:58

## 2022-06-05 RX ADMIN — HUMAN IMMUNOGLOBULIN G 20 G: 20 LIQUID INTRAVENOUS at 00:12

## 2022-06-05 RX ADMIN — SODIUM CHLORIDE 500 ML: 9 INJECTION, SOLUTION INTRAVENOUS at 17:40

## 2022-06-05 RX ADMIN — METRONIDAZOLE 500 MG: 500 TABLET ORAL at 07:52

## 2022-06-05 RX ADMIN — ACETAMINOPHEN 650 MG: 325 TABLET, FILM COATED ORAL at 11:02

## 2022-06-05 RX ADMIN — SUMATRIPTAN 6 MG: 6 INJECTION, SOLUTION SUBCUTANEOUS at 23:13

## 2022-06-05 RX ADMIN — METRONIDAZOLE 500 MG: 500 TABLET ORAL at 19:53

## 2022-06-05 RX ADMIN — ONDANSETRON 4 MG: 4 TABLET, ORALLY DISINTEGRATING ORAL at 18:44

## 2022-06-05 RX ADMIN — IBUPROFEN 600 MG: 600 TABLET, FILM COATED ORAL at 18:44

## 2022-06-05 RX ADMIN — ACETAMINOPHEN 650 MG: 325 TABLET, FILM COATED ORAL at 04:54

## 2022-06-05 RX ADMIN — SODIUM CHLORIDE 500 ML: 9 INJECTION, SOLUTION INTRAVENOUS at 22:20

## 2022-06-05 RX ADMIN — SUMATRIPTAN 6 MG: 6 INJECTION, SOLUTION SUBCUTANEOUS at 20:22

## 2022-06-05 ASSESSMENT — ACTIVITIES OF DAILY LIVING (ADL)
ADLS_ACUITY_SCORE: 20

## 2022-06-05 NOTE — PROVIDER NOTIFICATION
Pt reported a 7/10 headache. Pt received PRN dose of tylenol but headache did not improve. Pt reported that headaches often occur when receiving IVIG. RN paged team to ask for additional PRN medications to help with the headache. RN questioned Pt if there were any nonpharmacologic alternatives that could help until the Pt could get additional PRN medication but the Pt did not say that there was anything that helped.

## 2022-06-05 NOTE — PROGRESS NOTES
[facility]    Progress Note - Pediatric Service JAIME Team       Date of Admission:  6/3/2022    Assessment & Plan          Tushar Mancia is a 21 year old female who was admitted on 6/3/2022 with myasthenia gravis exacerbation requiring IVIG administration. She has known diagnosis of myasthenia gravis (anti-AChR Rachael positive, class IIA, s/p thymus removal in 2018) and follows with Dr. Dave outpatient. MG symptoms have been worsening for some time, including progression of bulbar symptoms and some decline in respiratory function. Other treatment options were discussed in clinic but after recent denial of prior auth (for Vyvgart) she required more urgent treatment - IVIG recommended by Dr. Dave as she previously saw benefit. During past 4-day treatment course she developed fevers and nausea, planning to repeat similar protocol (rather than same dose over shorter time period) to prevent additional side effects. Tolerated first dose well. Remains stable without significant bulbar or respiratory symptoms.     #Myasthenia Gravis Exacerbation  - Neurology consulted, appreciate recs  - Continue IVIG 20 g q24h x4 doses total    - Pre-medication with Tylenol and Benadryl    - PRNs per protocol: Tylenol, Benadryl, Zofran, albuterol, epi, etc.    - Monitoring per protocol  - NIFs twice per day (more important in evening with worse sx)     #Bacterial Vaginosis  Recently diagnosed as an outpatient at [facility] on 5/31.   - PTA Flagyl 500 mg BID for remainder of 7 day course      Diet: Peds Diet Age 9-18 yrs    DVT Prophylaxis: Low Risk/Ambulatory with no VTE prophylaxis indicated  Storey Catheter: Not present  Fluids: None  Central Lines: None  Cardiac Monitoring: None  Code Status:   Full Code     Disposition Plan   Expected Discharge: 06/06/2022 recommended to home following IVIG regimen (4 doses) pending improvement in symptoms      The  patient's care was discussed with the Attending Physician, Dr. Santos, Patient and Neuro Consultant.    Olesya Amado MD  Pediatric Service   [facility]  Securely message with the Vocera Web Console (learn more here)  Text page via [facility] Paging/Directory   Please see signed in provider for up to date coverage information  _________________________________________________________    Interval History   No acute events. During infusion she had some nausea and mild aches in her arms/legs, resolved with Zofran and Tylenol. Otherwise feeling similar to yesterday (i.e. before starting treatment). Endorses fatigue and general weakness but states most of her usual symptoms occur later in day - not currently having diplopia or blurry vision, trouble breathing, or difficulty with walking around. She does not have any questions or concerns today.     Data reviewed today: I reviewed all medications, new labs and imaging results over the last 24 hours.    Physical Exam   Vital Signs: Temp: 97.8  F (36.6  C) Temp src: Oral BP: 114/80 Pulse: 92   Resp: 16 SpO2: 99 % O2 Device: None (Room air)    Weight: 128 lbs 4.92 oz  GENERAL: Awake, alert, well-appearing female in no acute distress.  SKIN: Clear. No significant rash, abnormal pigmentation or lesions on exposed skin.   HEAD: Normocephalic.   EYES: EOM grossly intact. Normal conjunctivae.  MOUTH: Moist mucous membranes.   LUNGS: Normal work of breathing while sitting up in bed. Lungs clear to auscultation bilaterally.   HEART: Regular rhythm. Normal S1/S2. No murmurs. Normal pulses.  ABDOMEN: Soft, non-tender, not distended.   EXTREMITIES: No gross deformities.   NEUROLOGIC: No focal findings. Cranial nerves grossly intact. Strength not formally assessed.     Data   No lab results found in last 7 days.  No results found for this or any previous visit (from the past 24 hour(s)).  Medications     - MEDICATION INSTRUCTIONS -        sodium chloride

## 2022-06-05 NOTE — PLAN OF CARE
Goal Outcome Evaluation:    Afebrile. VSS. Pt tolerated day 2 of IVIG infusion beginning at max step. Pt reported mild body aches following administration. PRN ibuprofen administered x 1. Pt asleep after dose, appears to be comfortable. Mother at bedside earlier in the evening. Boyfriend at bedside through the night, attentive to pt.

## 2022-06-05 NOTE — PROGRESS NOTES
[facility]    Progress Note - Pediatric Service JAIME Team       Date of Admission:  6/3/2022    Assessment & Plan          Tushar Mancia is a 21 year old female who was admitted on 6/3/2022 with myasthenia gravis exacerbation requiring IVIG administration. She has known diagnosis of myasthenia gravis (anti-AChR Rachael positive, class IIA, s/p thymus removal in 2018) and follows with Dr. Dave outpatient. MG symptoms have been worsening for some time, including progression of bulbar symptoms and some decline in respiratory function. Other treatment options were discussed in clinic but after recent denial of prior auth (for Vyvgart) she required more urgent treatment - IVIG recommended by Dr. Dave as she previously saw benefit. During past 4-day treatment course she developed fevers and nausea, planning to repeat similar protocol (rather than same dose over shorter time period) to prevent additional side effects. Tolerating the first few doses.     #Myasthenia Gravis Exacerbation  - Neurology consulted, appreciate recs  - Continue IVIG 20 g q24h x4 doses total (last on 6/6/22)    - Pre-medication with Tylenol and Benadryl    - PRNs per protocol: Tylenol, Benadryl, Zofran, albuterol, epi, etc.    - Monitoring per protocol  - NIFs twice per day (more important in evening with worse sx)  - Will discuss with pharmacy if can scoot doses earlier to faciliatet an evening discharge tomorrow.     # Headache  Mild based on clinical exam. Certainly could be IvIg related. No confusion and not severe. rec PRN acetaminophen and ibuprofen,. If nauseated. Trial zofran.       #Bacterial Vaginosis  Recently diagnosed as an outpatient at [facility] on 5/31.   - PTA Flagyl 500 mg BID for remainder of 7 day course      Diet: Peds Diet Age 9-18 yrs    DVT Prophylaxis: Low Risk/Ambulatory with no VTE prophylaxis indicated  Storey Catheter: Not  present  Fluids: None  Central Lines: None  Cardiac Monitoring: None  Code Status: Full Code Full Code     Disposition Plan   Expected Discharge: 06/06/2022 recommended to home following IVIG regimen (4 doses) pending improvement in symptoms        Joss Santos MD  Pediatric Service   [facility]  Securely message with the Vocera Web Console (learn more here)  Text page via [facility] Paging/Directory   Please see signed in provider for up to date coverage information  _________________________________________________________    Interval History   No acute events. Mild headache this AM, some mild nausea without vomiting. No breakfast yet today. Some light sensitivty. No prior hx of migrains. Similar headache with last time she had IvIg. Symptoms not sig improved since admission. Feels very tired but schedule is a bit off. No fevers. No SOB and swalowing is normal. No confusuion     Data reviewed today: I reviewed all medications, new labs and imaging results over the last 24 hours.    Physical Exam   Vital Signs: Temp: 98.1  F (36.7  C) Temp src: Oral BP: 104/61 Pulse: 66   Resp: 16 SpO2: 100 % O2 Device: None (Room air)    Weight: 128 lbs 4.92 oz  GENERAL: Awake, alert, well-appearing female in no acute distress.  SKIN: Clear. No significant rash, abnormal pigmentation or lesions on exposed skin.   HEAD: Normocephalic.   EYES: EOM grossly intact. Normal conjunctivae.  MOUTH: Moist mucous membranes.   LUNGS: Normal work of breathing while sitting up in bed. Lungs clear to auscultation bilaterally.   HEART: Regular rhythm. Normal S1/S2. No murmurs. Normal pulses.  ABDOMEN: Soft, non-tender, not distended.   EXTREMITIES: No gross deformities.   NEUROLOGIC: No focal findings. Cranial nerves grossly intact. Strength not formally assessed.     Data   No lab results found in last 7 days.  No results found for this or any previous visit (from the past 24  hour(s)).  Medications     - MEDICATION INSTRUCTIONS -       sodium chloride

## 2022-06-05 NOTE — DISCHARGE SUMMARY
[facility]  Discharge Summary - Medicine & Pediatrics    Date of Admission:  6/3/2022  Date of Discharge:  6/7/2022  Discharging Provider: Dr. Primitivo Mg  Discharge Service: Pediatric Service  VIOLET Team    Discharge Diagnoses   Myasthenia gravis exacerbation   Bacterial vaginosis  Concern for adjustment disorder secondary to chronic illness    Follow-ups Needed After Discharge      [facility] Specialty Care Follow Up    Please follow up with the following specialists after discharge:   Neurology (Dr. Dave) at next available appointment for hospital follow up and to discuss next steps for management.         Primary Care Follow Up    Please follow up with your primary care provider as needed.         Pediatric Mental Health Referral    Services: Psychotherapy/Counseling (non-medication)   Reason for Referral: Medical Condition (Health Psychology)     Discharge Disposition   Discharged to home  Condition at discharge: Stable    Hospital Course   Tushar Mancia is a 21 year old female who was admitted on 6/3/2022 for myasthenia gravis exacerbation. The following problems were addressed during her hospitalization:    Myasthenia Gravis Exacerbation  She has known diagnosis of myasthenia gravis (anti-AChR Rachael positive, class IIA, s/p thymus removal in 2018) and follows with Dr. Dave outpatient, who recommended admission due to worsening symptoms (including progression of bulbar symptoms and some decline in respiratory function) and history of response to IVIG in the past. She was initiated on the same IVIG regimen with plan for 20 mg IVIG given every 24 hours for 4 total doses. The third and fourth doses were delayed due to persistent side effects including headaches, myalgias, and nausea/vomiting. Nausea was well controlled with Zofran. Headaches were not well controlled with Tylenol/ibuprofen or sumatriptan (previously helpful) though overall tolerable.  Side effects also improved with slowing of infusion rate for second two doses. Her baseline neurologic symptoms (i.e. MG exacerbation) were stable to slightly improved - she noted it was hard to tell since she had difficulty sleeping in the hospital, but thinks her generalized fatigue and weakness were slightly improved in past 2 days. No significant respiratory or bulbar symptoms while hospitalized. She was monitored with NIFs. Neurologic exam at the time of discharge was at baseline.     Bacterial Vaginosis  Diagnosed prior to admission (5/31) as outpatient at [facility] and prescribed 7 day course of Flagyl, which was continued during her admission.     Concern for Adjustment Disorder secondary to Chronic Illness  Had long discussion with her on day of discharge regarding her views on her chronic illness in general and specifically limitations she perceives from different treatment options (for example long-term treatment, taking medication 3 times per day, side effects from Mestinon). Recommended health psychology as an option to further explore these topics, especially given chronic illness, and she was interested in this as an option. Referral placed.     Consultations This Hospital Stay   PEDS NEUROLOGY IP CONSULT     Code Status   Full Code     The patient was discussed with the Attending Physician Dr. Mg and the Resident Physician Dr. Amado.    Xander Lui   Medical Student (MS3)   VIOLET Team Service  [facility] [address]  [address] [place] [id number]  Phone: [phone]    Resident/Fellow Attestation   I, Olesya Amado MD, was present with the medical/JOSE LUIS student who participated in the service and in the documentation of the note.  I have verified the history and personally performed the physical exam and medical decision making.  I agree with the assessment and plan of care as documented in the note.      Olesya Amado MD    Pediatrics Resident, PGY1  ______________________________________________________________________    Physical Exam   Vital Signs: Temp: 98  F (36.7  C) Temp src: Oral BP: 123/79 Pulse: 70   Resp: 16 SpO2: 100 % O2 Device: None (Room air)    Weight: 128 lbs 4.92 oz  Constitutional: Awake, alert, cooperative, no apparent distress.   Skin: No rashes, lesions, or abnormal pigmentation on exposed skin.   Head: Normocephalic.   Eyes: Lids and lashes normal, extra ocular muscles intact, sclera clear, conjunctiva normal.   Nose: Normal with no discharge.   Mouth: Moist mucous membranes.   Respiratory: No increased work of breathing, lungs clear to auscultation bilaterally.   Musculoskeletal: No gross deformities. Normal ROM, including neck.   Neurologic: Awake, alert, oriented to name, place and time. Cranial nerves II-XII are grossly intact.   Neuropsychiatric: General: normal, calm and normal eye contact. Level of consciousness: alert / normal. Affect: Mildly anxious regarding her treatment plan, overall appropriate.       Primary Care Physician   Physician No Ref-Primary    Discharge Orders      Reason for your hospital stay    Tushar was in the hospital to receive IVIG treatments for her myasthenia gravis.     Activity    Your activity upon discharge: activity as tolerated     Primary Care Follow Up    Please follow up with your primary care provider as needed.     [facility] Specialty Care Follow Up    Please follow up with the following specialists after discharge:   Neurology (Dr. Dave) at next available appointment for hospital follow up and to discuss next steps for management.   Please call [phone] if you have not heard regarding these appointments within 7 days of discharge.     Diet    Follow this diet upon discharge: regular diet     Significant Results and Procedures   None    Discharge Medications   Current Discharge Medication List      START taking these medications    Details   acetaminophen  (TYLENOL) 325 MG tablet Take 2 tablets (650 mg) by mouth every 6 hours as needed for mild pain or fever      ibuprofen (ADVIL/MOTRIN) 600 MG tablet Take 1 tablet (600 mg) by mouth every 6 hours as needed for moderate pain      ondansetron (ZOFRAN ODT) 4 MG ODT tab Take 1 tablet (4 mg) by mouth every 6 hours as needed for nausea or vomiting  Qty: 10 tablet, Refills: 0    Associated Diagnoses: Myasthenia gravis with exacerbation (H)         CONTINUE these medications which have NOT CHANGED    Details   albuterol (PROVENTIL) (2.5 MG/3ML) 0.083% neb solution Take 1 vial (2.5 mg) by nebulization every 4 hours as needed (cough)  Qty: 60 mL, Refills: 0      ORDER FOR DME, SET TO LOCAL PRINT, Equipment being ordered: knee brace  Qty: 1 Device, Refills: 0    Associated Diagnoses: Right knee pain           Allergies   No Known Allergies

## 2022-06-05 NOTE — PLAN OF CARE
Goal Outcome Evaluation:    VSS. Afebrile. Pt had PIV removed in the morning due to burning at the site. It was replaced by vascular and NS bolus was given. Pt has had a headache most of the day, ranging from 5/10 to 7/10 pain. Was not managed with Tylenol x2 and Ibuprofen x2, so RN administered 500mL saline bolus. Zofran given for reported nausea. IVIG administration was moved to 1900, but the pt's headache did not improve and she requested to move back to the original time of 2200. MD approved. Pt's mom and dad were here today. Pt was able to leave the unit to visit brother, too. Mom is at the bedside currently.

## 2022-06-05 NOTE — PROGRESS NOTES
Paged vascular access. Pt reported a burning IV. IV flushed fine. Vascular will assess or provide a recommendation.

## 2022-06-06 LAB — HCG UR QL: NEGATIVE

## 2022-06-06 PROCEDURE — 94150 VITAL CAPACITY TEST: CPT

## 2022-06-06 PROCEDURE — 120N000007 HC R&B PEDS UMMC

## 2022-06-06 PROCEDURE — 250N000013 HC RX MED GY IP 250 OP 250 PS 637: Performed by: STUDENT IN AN ORGANIZED HEALTH CARE EDUCATION/TRAINING PROGRAM

## 2022-06-06 PROCEDURE — 99233 SBSQ HOSP IP/OBS HIGH 50: CPT | Mod: GC | Performed by: ORTHOPAEDIC SURGERY

## 2022-06-06 PROCEDURE — 250N000013 HC RX MED GY IP 250 OP 250 PS 637

## 2022-06-06 PROCEDURE — 81025 URINE PREGNANCY TEST: CPT | Performed by: ORTHOPAEDIC SURGERY

## 2022-06-06 PROCEDURE — 999N000157 HC STATISTIC RCP TIME EA 10 MIN

## 2022-06-06 PROCEDURE — 250N000011 HC RX IP 250 OP 636

## 2022-06-06 PROCEDURE — 99231 SBSQ HOSP IP/OBS SF/LOW 25: CPT | Performed by: PSYCHIATRY & NEUROLOGY

## 2022-06-06 RX ADMIN — ACETAMINOPHEN 650 MG: 325 TABLET, FILM COATED ORAL at 10:31

## 2022-06-06 RX ADMIN — ONDANSETRON 4 MG: 2 INJECTION INTRAMUSCULAR; INTRAVENOUS at 00:02

## 2022-06-06 RX ADMIN — HUMAN IMMUNOGLOBULIN G 20 G: 20 LIQUID INTRAVENOUS at 11:01

## 2022-06-06 RX ADMIN — METRONIDAZOLE 500 MG: 500 TABLET ORAL at 07:56

## 2022-06-06 RX ADMIN — IBUPROFEN 600 MG: 600 TABLET, FILM COATED ORAL at 01:52

## 2022-06-06 RX ADMIN — METRONIDAZOLE 500 MG: 500 TABLET ORAL at 20:05

## 2022-06-06 RX ADMIN — DIPHENHYDRAMINE HYDROCHLORIDE 50 MG: 25 CAPSULE ORAL at 10:31

## 2022-06-06 RX ADMIN — ONDANSETRON 4 MG: 4 TABLET, ORALLY DISINTEGRATING ORAL at 08:17

## 2022-06-06 RX ADMIN — ACETAMINOPHEN 650 MG: 325 TABLET, FILM COATED ORAL at 23:05

## 2022-06-06 RX ADMIN — Medication 5 MG: at 00:14

## 2022-06-06 ASSESSMENT — ACTIVITIES OF DAILY LIVING (ADL)
ADLS_ACUITY_SCORE: 24
ADLS_ACUITY_SCORE: 24
ADLS_ACUITY_SCORE: 20
ADLS_ACUITY_SCORE: 24
ADLS_ACUITY_SCORE: 20

## 2022-06-06 NOTE — PROVIDER NOTIFICATION
06/05/22 7900   DVPRS Pain Rating Score   (DVPRS) Pain Rating Score  5   Pt reporting intermittent headaches following first dose of sumatriptan. Provider notified. Administered second dose per order.

## 2022-06-06 NOTE — PROVIDER NOTIFICATION
06/06/22 0152   DVPRS Pain Rating Score   (DVPRS) Pain Rating Score  3   Pt reporting body aches 3/10. Administered ibuprofen. Provider notified.

## 2022-06-06 NOTE — PROVIDER NOTIFICATION
06/05/22 2000   DVPRS Pain Rating Score   (DVPRS) Pain Rating Score  8     Pt increasing headache pain reported 8/10. Provider notified. Sumatriptan administered per instruction.

## 2022-06-06 NOTE — PROVIDER NOTIFICATION
06/05/22 2211   DVPRS Pain Rating Score   (DVPRS) Pain Rating Score  5   Pt reporting intermittent headache reports 5/10. Provider notified. 500 ml NS bolus administered per instruction.

## 2022-06-06 NOTE — PROVIDER NOTIFICATION
06/05/22 2315   Output (mL)   Emesis 240 mL     Pt emesis following bolus NS. Provider notified.

## 2022-06-06 NOTE — PROGRESS NOTES
Pediatric Neurology Inpatient Progress Note    Patient name: Tushar Mancia  Patient YOB: 2000  Medical record number: [id number]    Date of visit: June 6, 2022    Chief complaint: myasthenia gravis flare     Interval History:    Tushar is seen today for follow up of above. She has received two doses of IVIG. She developed headache, nausea, and vomiting. She had similar symptoms with the last administration of IVIG, but this time feels worse to her. She has received normal saline boluses, tylenol, ibuprofen, benadryl, Zofran, and sumatriptan for symptom control. She rates her headache at 4/10 this morning. She describes the headache as pounding. She does have a history of headaches, not migrainous in nature. She feels her MG symptoms are about the same. No double vision, ptosis today. Some fatigable weakness. No trouble swallowing. Has not been sleeping well in the hospital, which she feels worsens symptoms.       Current Facility-Administered Medications   Medication     acetaminophen (TYLENOL) tablet 650 mg     acetaminophen (TYLENOL) tablet 650 mg     albuterol (PROVENTIL HFA/VENTOLIN HFA) inhaler    Or     albuterol (PROVENTIL) neb solution 2.5 mg     albuterol (PROVENTIL) neb solution 2.5 mg     diphenhydrAMINE (BENADRYL) capsule 50 mg     diphenhydrAMINE (BENADRYL) injection 50 mg     EPINEPHrine (ADRENALIN) kit 0.3 mg     ibuprofen (ADVIL/MOTRIN) tablet 600 mg     immune globulin - sucrose free 10 % injection 20 g     lidocaine (LMX4) cream     lidocaine 1 % 0.2-0.4 mL     MEDICATION INSTRUCTIONS-Stop infusion if hypersensitivity reaction occurs     melatonin tablet 5 mg     methylPREDNISolone sodium succinate (solu-MEDROL) pediatric injection 120 mg     metroNIDAZOLE (FLAGYL) tablet 500 mg     ondansetron (ZOFRAN ODT) ODT tab 4 mg     sodium chloride (PF) 0.9% PF flush 0.2-5 mL     sodium chloride (PF) 0.9% PF flush 3 mL     sodium chloride 0.9% infusion       No Known  "Allergies    Objective:     /74   Pulse 87   Temp 99.5  F (37.5  C) (Oral)   Resp 16   Ht 1.645 m (5' 4.76\")   Wt 58.2 kg (128 lb 4.9 oz)   SpO2 100%   BMI 21.51 kg/m      Gen: The patient is awake and alert; comfortable and in no acute distress  Head: NC/AT  Eyes: PERRL, EOMI with spontaneous conjugate gaze  RESP: No increased work of breathing. Can count to 24 and 20 in one breath   CV: Regular rate   ABD: Soft non-tender, non-distended  Extremities: warm and well perfused without cyanosis or clubbing  Skin: No rash appreciated.     I completed a thorough neurological exam including:   Neurological Exam:  Mental Status: awake, alert, attentive, oriented to time, place, and situation. Able to follow commands. Speech is fluent.  CNs:  II-XII intact, PERRL, EOMIs with no nystagmus or diplopia. No ptosis noted. Visual fields intact to confrontation, face is symmetric. Palate & uvula rise, are symmetric, tongue protrudes to midline. No pronator drift.  Motor: normal bulk and tone. Strength 4/5 throughout with fatigability in the lower extremities with repetitive testing  Sensation: intact for light touch in all limbs  Coordination: no dysmetria on FTN   Reflexes: 2+ symmetric throughout   Gait: not tested     Data Review:     Recent Lab Review:     NIF's documented as -35, -35, and -40    No results found for this or any previous visit (from the past 24 hour(s)).     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 She has completed 2 of 4 treatments thus far and has had headache, nausea, and vomiting presumably treatment side effects, but is feeling better today than yesterday.     Plan: "     1. Dose 3 and 4 of IVIG today and tomorrow. Appreciate primary team's assistance with symptom management.   2. Will look into neurology follow up and if we need to move this up as patient and parent have questions for Dr. Dave regarding ongoing management.     - This patient's case and my recommendations were discussed with Joss Santos* or the covering colleague. Patient seen and discussed with attending pediatric neurologist, Dr. Guaman.     I spent 20 minutes face-to-face or coordinating care of Tushar Mancia. Over 50% of our time on the unit was spent counseling the patient and/or coordinating care regarding exacerbation of myasthenia gravis.     GODWIN Thomason, PNP  Pediatric Neurology  Pager [phone]

## 2022-06-06 NOTE — PROVIDER NOTIFICATION
06/05/22 1919   DVPRS Pain Rating Score   (DVPRS) Pain Rating Score  6     Pt reporting increased headache pain & nausea. Provider notified. Benadryl administered per instruction.

## 2022-06-06 NOTE — PROGRESS NOTES
[facility]    Progress Note - Pediatric Service VIOLET Team    Date of Admission:  6/3/2022    Assessment & Plan        Tushar Mancia is a 21 year old female who was admitted on 6/3/2022 with myasthenia gravis exacerbation requiring IVIG administration. She has known diagnosis of myasthenia gravis (anti-AChR Rachael positive, class IIA, s/p thymus removal in 2018) and follows with Dr. Dave outpatient. MG symptoms have been worsening for some time, including progression of bulbar symptoms and some decline in respiratory function. She hasn't tolerated the first two IVIG doses well, noting significant headache, fatigue, and myalgias but is agreeable to continuation with treatment at a lower infusion rate. Requires continued admission for close clinical monitoring and for completion of IVIG course.    Changes Today  - Continue IVIG    #Myasthenia Gravis Exacerbation  - Neurology consulted, appreciate recs  - Continue IVIG 20 g q24h x4 doses total (last on 6/5/22, receiving third dose today)    - Pre-medication with Tylenol and Benadryl    - PRNs per protocol: Tylenol, Benadryl, Zofran, albuterol, epi, etc.    - Monitoring per protocol  - NIFs twice per day (more important in evening with worse sx)  - Pt and her family requested to speak with Dr. Dave, on call neurology team was contacted and a message was left.    # Headache  # Myalgias  # Emesis  Significant symptoms overnight, improved this morning. Certainly could be IvIg related. No confusion and not severe. No history of migraines at home. Has been treated while inpatient with Sumatriptan during past hospitalizations.   - PRN acetaminophen, ibuprofen  - PRN Zofran  - Has had improvement with Sumatriptan in the past including 6/5 overnight. Can give up to 2 doses tonight if needed.   - UPT    #Bacterial Vaginosis  Recently diagnosed as an outpatient at [facility] on 5/31.   - PTA Flagyl  500 mg BID for remainder of 7 day course (complete evening of 6/7/2022)    Social  Family has friend/uncle who is a physician that has been involved in conversations regarding Tushar's medical care. They may ask to have neurology or other team members speak to him.   - Name is:Micah [phone]       Diet: Peds Diet Age 9-18 yrs    DVT Prophylaxis: Low Risk/Ambulatory with no VTE prophylaxis indicated  Storey Catheter: Not present  Fluids: None  Central Lines: None  Cardiac Monitoring: None  Code Status: Full Code Full Code     Disposition Plan   Expected Discharge: 06/07 or 6/08 recommended to home following IVIG regimen (4 doses, pt receiving third today, 6/06/2022) pending improvement in symptoms        Xander Lui  Pediatric Service   [facility]  Securely message with the Vocera Web Console (learn more here)  Text page via [facility] Paging/Directory   Please see signed in provider for up to date coverage information    I was present with the medical student who participated in the service and in the  documentation of the note. I have verified the history and personally performed the  exam and medical decision making. I agree with the assessment and plan of  care as documented in the note.  Lynnette Booth MD  PGY1  Date of Service (when I saw the patient): 06/06/22        _________________________________________________________    Interval History   Significant headache, myalgias, and one episode of vomiting overnight beginning at 1900, persisted throughout the night despite treatment with subQ sumatriptan x2, tylenol, ibuprofen, and NS bolus. Mild headache this AM, some mild nausea without vomiting. She has eaten yogurt and drank fluids. No prior hx of migraines. Similar headache with last time she had IvIg. Improved with Sumatriptan. MG symptoms not sig improved since admission. Feels fatigued, but improved from previous days. No fevers. No SOB and  swallowing is normal. No confusion     Data reviewed today: I reviewed all medications, new labs and imaging results over the last 24 hours.    Physical Exam   Vital Signs: Temp: 98  F (36.7  C) Temp src: Oral BP: 117/70 Pulse: 80   Resp: 20 SpO2: 100 % O2 Device: None (Room air)    Weight: 128 lbs 4.92 oz  GENERAL: Awake, alert, well-appearing female in no acute distress.  SKIN: Clear. No significant rash, abnormal pigmentation or lesions on exposed skin.   HEAD: Normocephalic.   EYES: EOM grossly intact. Normal conjunctivae.  MOUTH: Moist mucous membranes.   LUNGS: Normal work of breathing while sitting up in bed. Lungs clear to auscultation bilaterally.   HEART: Regular rhythm. Normal S1/S2. No murmurs. Normal pulses.  ABDOMEN: Soft, non-tender, not distended.   EXTREMITIES: No gross deformities.   NEUROLOGIC: No focal findings. Cranial nerves grossly intact. Strength not formally assessed.     Data   No lab results found in last 7 days.  No results found for this or any previous visit (from the past 24 hour(s)).  Medications     - MEDICATION INSTRUCTIONS -       sodium chloride

## 2022-06-06 NOTE — PLAN OF CARE
Goal Outcome Evaluation:    VSS. Afebrile. Pt complained of headache pain and nausea. Max 8/10. Administered tylenol x1, benadryl x1 due to max time for Zofran. Gave sumatriptan x2 for migraine pain. Zofran IV x1, melatonin x1. Provider notified and involved in progression on cares and medication steps. Pt reported relief but then reported body aches, administered ibuprofen. Provider notified. Called off IVIG administration at 0200. Team notified. Plan for IVIG in AM, time TBD.

## 2022-06-07 VITALS
BODY MASS INDEX: 21.38 KG/M2 | WEIGHT: 128.31 LBS | TEMPERATURE: 98 F | SYSTOLIC BLOOD PRESSURE: 123 MMHG | RESPIRATION RATE: 16 BRPM | DIASTOLIC BLOOD PRESSURE: 79 MMHG | HEART RATE: 70 BPM | OXYGEN SATURATION: 100 % | HEIGHT: 65 IN

## 2022-06-07 PROCEDURE — 250N000013 HC RX MED GY IP 250 OP 250 PS 637: Performed by: STUDENT IN AN ORGANIZED HEALTH CARE EDUCATION/TRAINING PROGRAM

## 2022-06-07 PROCEDURE — 999N000157 HC STATISTIC RCP TIME EA 10 MIN

## 2022-06-07 PROCEDURE — 94150 VITAL CAPACITY TEST: CPT

## 2022-06-07 PROCEDURE — 999N000127 HC STATISTIC PERIPHERAL IV START W US GUIDANCE

## 2022-06-07 PROCEDURE — 999N000040 HC STATISTIC CONSULT NO CHARGE VASC ACCESS

## 2022-06-07 PROCEDURE — 250N000013 HC RX MED GY IP 250 OP 250 PS 637

## 2022-06-07 PROCEDURE — 99232 SBSQ HOSP IP/OBS MODERATE 35: CPT | Performed by: PSYCHIATRY & NEUROLOGY

## 2022-06-07 PROCEDURE — 250N000011 HC RX IP 250 OP 636

## 2022-06-07 PROCEDURE — 99239 HOSP IP/OBS DSCHRG MGMT >30: CPT | Mod: GC | Performed by: INTERNAL MEDICINE

## 2022-06-07 RX ORDER — IBUPROFEN 600 MG/1
600 TABLET, FILM COATED ORAL EVERY 6 HOURS PRN
COMMUNITY
Start: 2022-06-07

## 2022-06-07 RX ORDER — ACETAMINOPHEN 325 MG/1
650 TABLET ORAL EVERY 6 HOURS PRN
COMMUNITY
Start: 2022-06-07

## 2022-06-07 RX ORDER — ONDANSETRON 4 MG/1
4 TABLET, ORALLY DISINTEGRATING ORAL EVERY 6 HOURS PRN
Qty: 10 TABLET | Refills: 0 | Status: SHIPPED | OUTPATIENT
Start: 2022-06-07 | End: 2024-03-20

## 2022-06-07 RX ADMIN — HUMAN IMMUNOGLOBULIN G 20 G: 20 LIQUID INTRAVENOUS at 12:19

## 2022-06-07 RX ADMIN — ONDANSETRON 4 MG: 4 TABLET, ORALLY DISINTEGRATING ORAL at 10:52

## 2022-06-07 RX ADMIN — DIPHENHYDRAMINE HYDROCHLORIDE 50 MG: 25 CAPSULE ORAL at 10:51

## 2022-06-07 RX ADMIN — METRONIDAZOLE 500 MG: 500 TABLET ORAL at 08:14

## 2022-06-07 RX ADMIN — ACETAMINOPHEN 650 MG: 325 TABLET, FILM COATED ORAL at 10:51

## 2022-06-07 RX ADMIN — IBUPROFEN 600 MG: 600 TABLET, FILM COATED ORAL at 03:42

## 2022-06-07 ASSESSMENT — ACTIVITIES OF DAILY LIVING (ADL)
ADLS_ACUITY_SCORE: 20

## 2022-06-07 NOTE — PROGRESS NOTES
Patient discharged to home w/ her boyfriend around 1630 on 6/7/22. All questions and concerns addressed, AVS printed and discharge education completed. Discharge med being sent to outside pharmacy, no other meds at this time.

## 2022-06-07 NOTE — PROGRESS NOTES
"  Pediatric Neurology Inpatient Progress Note    Patient name: Tushar Mancia  Patient YOB: 2000  Medical record number: [id number]    Date of visit: 06/07/22    Chief complaint: myasthenia gravis flare     Interval History:    Tushar is seen today for follow up of above. She has received 3 doses of IVIG, the 3rd being last night. She still has a headache, rated at 6/10 today. No double vision, ptosis today. Some fatigable weakness. No trouble swallowing. Has not been sleeping well in the hospital, which she feels makes it difficult to assess how her symptoms of MG are.         Current Facility-Administered Medications   Medication     acetaminophen (TYLENOL) tablet 650 mg     acetaminophen (TYLENOL) tablet 650 mg     albuterol (PROVENTIL HFA/VENTOLIN HFA) inhaler    Or     albuterol (PROVENTIL) neb solution 2.5 mg     albuterol (PROVENTIL) neb solution 2.5 mg     diphenhydrAMINE (BENADRYL) capsule 50 mg     diphenhydrAMINE (BENADRYL) injection 50 mg     EPINEPHrine (ADRENALIN) kit 0.3 mg     ibuprofen (ADVIL/MOTRIN) tablet 600 mg     immune globulin - sucrose free 10 % injection 20 g     lidocaine (LMX4) cream     lidocaine 1 % 0.2-0.4 mL     MEDICATION INSTRUCTIONS-Stop infusion if hypersensitivity reaction occurs     melatonin tablet 5 mg     methylPREDNISolone sodium succinate (solu-MEDROL) pediatric injection 120 mg     ondansetron (ZOFRAN ODT) ODT tab 4 mg     sodium chloride (PF) 0.9% PF flush 0.2-5 mL     sodium chloride (PF) 0.9% PF flush 3 mL     sodium chloride 0.9% infusion       No Known Allergies    Objective:     /63   Pulse 82   Temp 98.1  F (36.7  C) (Oral)   Resp 14   Ht 1.645 m (5' 4.76\")   Wt 58.2 kg (128 lb 4.9 oz)   SpO2 99%   BMI 21.51 kg/m      Gen: The patient is awake and alert; comfortable and in no acute distress  Head: NC/AT  Eyes: PERRL, EOMI with spontaneous conjugate gaze  RESP: No increased work of breathing. Can count to 27 in one breath   CV: " Regular rate   ABD: Soft non-tender, non-distended  Extremities: warm and well perfused without cyanosis or clubbing  Skin: No rash appreciated.     I completed a thorough neurological exam including:   Neurological Exam:  Mental Status: awake, alert, attentive, oriented to time, place, and situation. Able to follow commands. Speech is fluent.  CNs:  II-XII intact, PERRL, EOMIs with no nystagmus or diplopia. No ptosis noted. Visual fields intact to confrontation, face is symmetric. Palate & uvula rise, are symmetric, tongue protrudes to midline. No pronator drift.  Motor: normal bulk and tone. Strength 4/5 throughout with fatigability in the lower extremities with repetitive testing  Sensation: intact for light touch in all limbs  Coordination: no dysmetria on FTN   Reflexes: 2+ symmetric throughout   Gait: not tested     Data Review:     Recent Lab Review:     NIF's documented throughout stay as mostly -35 and -40 cm h20 and -30 (once)    Recent Results (from the past 24 hour(s))   HCG qualitative urine    Collection Time: 06/06/22  1:06 PM   Result Value Ref Range    hCG Urine Qualitative Negative Negative        Assessment and Plan:     Tushar Mancia is a 21 year old female with the following relevant neurological history:     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 She has completed 3 of 4 treatments thus far and has had headache, nausea, and vomiting presumably treatment side effects. Overall, she is doing better from a side effect standpoint today. She has not noted in any improvement nor worsening of her MG symptoms. Patient will follow up in outpatient neurology to discuss ongoing treatment options.       Plan:     1. Dose 4 of IVIG today. She may discharge home after this dose.   2. Will look  into neurology follow up and if we need to move this up as patient and parent have questions for Dr. Dave regarding ongoing management. Our team will reach out to patient with directions on when to follow up.     - This patient's case and my recommendations were discussed with Joss Santos* or the covering colleague. Patient seen and discussed with attending pediatric neurologist, Dr. Guaman.     I spent 25 minutes face-to-face or coordinating care of Tushar Mancia. Over 50% of our time on the unit was spent counseling the patient and/or coordinating care regarding exacerbation of myasthenia gravis.     GODWIN Thomason, PNP  Pediatric Neurology  Pager [phone]

## 2022-06-07 NOTE — PLAN OF CARE
Goal Outcome Evaluation:    Plan of Care Reviewed With: patient     Overall Patient Progress: improving     VSS. Patient hesitant to complete 4th dose of IVIG but agreed after talking w/ MDs and started around 1150. Zofran, tylenol, and benadryl given as pre-meds. Patient rated headache as 5/10 all day, unchanged w/ meds and rest. States other health concerns (fatigue, weakness) seem better than admission. Voiding. Drinking well, eating ok. Lost IV, new one placed prior to last IVIG. Ran IVIG as a cycle d/t previous sensitivities. Patient adequate for discharge.

## 2022-06-07 NOTE — PLAN OF CARE
Goal Outcome Evaluation:  No complaints of pain today, nausea controlled with zofran, tolerating PO. IVIG tolerated at slower rate today.

## 2022-06-07 NOTE — PHARMACY-ADMISSION MEDICATION HISTORY
Admission Medication History Completed by Pharmacy    See [facility] Admission Navigator for allergy information, preferred outpatient pharmacy, prior to admission medications and immunization status.     Medication History Sources:     Dispense report, care everywhere.    Changes made to PTA medication list (reason):    Added: None    Deleted: None    Changed: None    Additional Information:    Unable to contact for medication history.    Of note:  -Pt prescribed metronidazole 500mg by mouth twice daily for 7 days on 6/1/22    Prior to Admission medications    Medication Sig Last Dose Taking? Auth Provider   albuterol (PROVENTIL) (2.5 MG/3ML) 0.083% neb solution Take 1 vial (2.5 mg) by nebulization every 4 hours as needed (cough) Unknown Yes Jean Marie Kwan,    ORDER FOR DME, SET TO LOCAL PRINT, Equipment being ordered: Ivonne Metcalf MD       Date completed: 06/07/22    Medication history completed by: Binu Juarez

## 2022-06-07 NOTE — PLAN OF CARE
Goal Outcome Evaluation:    7469-4070: Afebrile. VSS on RA. Complaints of an unrelieved headache throughout the night max 5/10. PRN tylenol and ibuprofen x 1. No nausea. Good PO intake. Voiding. Boyfriend remained at bedside overnight. Pt updated on POC. Continue to monitor.

## 2022-06-07 NOTE — PROGRESS NOTES
"   06/06/22 2019   Child Life   Location Med/Surg  (Unit 6 admission)   Intervention Initial Assessment;Supportive Check In  (This writer re-introduced self and services, patient familiar with hospital setting. Patient shared she was \"doing well\" and verbalized understanding of medical plan. Pt shared her family is supportive but \"gives space\" when she needs it. Pt shared she pursing a degree in psychology and managing summer course right now. This writer validated multiple stressors and encouraged pt to continue practicing self-advocacy. No further needs identified at this time.)   Anxiety Low Anxiety   Able to Shift Focus From Anxiety Easy   Outcomes/Follow Up Continue to Follow/Support     "

## 2022-06-08 ENCOUNTER — PATIENT OUTREACH (OUTPATIENT)
Dept: CARE COORDINATION | Facility: CLINIC | Age: 22
End: 2022-06-08
Payer: COMMERCIAL

## 2022-06-08 DIAGNOSIS — Z71.89 OTHER SPECIFIED COUNSELING: ICD-10-CM

## 2022-06-08 NOTE — PROGRESS NOTES
Clinic Care Coordination Contact  Community Health Worker Initial Outreach    CHW Initial Information Gathering:  Referral Source: IP Report  Preferred Hospital: [facility], [place]  [phone]  Current living arrangement:: Other (Apartment with roomates)  Type of residence:: Apartment  Community Resources: None  Supplies Currently Used at Home: None  Equipment Currently Used at Home: none  Informal Support system:: Family, Friends, Parent  No PCP office visit in Past Year: No  Transportation means:: Family, Friend  CHW Additional Questions  If ED/Hospital discharge, follow-up appointment scheduled as recommended?: No  Patient agreeable to assistance with scheduling?: Yes  CHW assisted patient to schedule (specify): CHW scheduled follow up appointment on 06/16/2022  Medication changes made following ED/Hospital discharge?: Yes, patient to be scheduled with CC RN/SW within approx 1 business day  [facility] active?: Yes  Patient sent Social Determinants of Health questionnaire?: Yes    Patient accepts CC: Yes. Patient scheduled for assessment with CC RN on 06/28/2022 at 3:30 PM. Patient noted desire to discuss ongoing medical concerns and finding a psychologist.     [facility]: Post-Discharge Note  SITUATION                                                      Admission:    Admission Date: 06/03/22   Reason for Admission: myasthenia gravis exacerbation  Discharge:   Discharge Date: 06/07/22  Discharge Diagnosis: Myasthenia gravis exacerbation,   Bacterial vaginosis,  Concern for adjustment disorder secondary to chronic illness    BACKGROUND                                                      Per hospital discharge summary and inpatient provider notes:    Tushar Mancia is a 21 year old female who has a history of Myasthenia Gravis and is admitted for MG exacerbation and IVIG administration.     Patient recently evaluated by Dr. Dave in clinic for worsening of MG symptoms and hope was to  start Vyvgart as patient was hesitant to start immunosuppressant medications chronically. PA for Vyvgart was denied and patient's symptoms continued to not improve. Patient spoke with Dr. Dave and through shared decision making patient opted to be admitted to have IVIG administered.      Patient notes weakness and eyelid drooping at the end of the day with some double vision and SOB on some days with increased exertion.      Recently began treatment for bacterial vaginosis. She has had no fevers, chills, cough, diarrhea, vomiting, or syncope.    ASSESSMENT      Enrollment  Primary Care Care Coordination Status: Potential    Discharge Assessment  How are you doing now that you are home?: I'm doing okay  How are your symptoms? (Red Flag symptoms escalate to triage hotline per guidelines): Improved  Do you feel your condition is stable enough to be safe at home until your provider visit?: Yes  Does the patient have their discharge instructions? : Yes  Does the patient have questions regarding their discharge instructions? : No  Were you started on any new medications or were there changes to any of your previous medications? : Yes  Does the patient have all of their medications?: Yes  Do you have questions regarding any of your medications? : No  Do you have all of your needed medical supplies or equipment (DME)?  (i.e. oxygen tank, CPAP, cane, etc.): Yes  Discharge follow-up appointment scheduled within 14 calendar days? : No  Is patient agreeable to assistance with scheduling? : Yes (CHW scheduled a hospital follow up appointment on 06/16/2022 at the [facility])    Post-op (CHW CTA Only)  If the patient had a surgery or procedure, do they have any questions for a nurse?: No    PLAN                                                      Outpatient Plan:      Please follow up with the following specialists after discharge:  Neurology (Dr. Dave) at next available appointment for  hospital follow up and to discuss next steps for management.   Please call [phone] if you have not heard regarding these appointments within 7 days of discharge.    Please follow up with your primary care provider as needed.    Future Appointments   Date Time Provider Department Center   6/16/2022 10:00 AM Obi Yap MD ICFMOB [facility]   6/28/2022  3:30 PM SPRS [facility] RN ICCSUP [facility]   8/29/2022  4:00 PM Obi Dave MD [facility]         For any urgent concerns, please contact our 24 hour nurse triage line: [phone] ([phone])       Raquel Yap  Community Health Worker  [facility]  Ph: [phone]

## 2022-06-22 ENCOUNTER — OFFICE VISIT (OUTPATIENT)
Dept: PEDIATRIC NEUROLOGY | Facility: CLINIC | Age: 22
End: 2022-06-22
Attending: PSYCHIATRY & NEUROLOGY
Payer: COMMERCIAL

## 2022-06-22 VITALS
TEMPERATURE: 98.4 F | HEIGHT: 66 IN | DIASTOLIC BLOOD PRESSURE: 71 MMHG | OXYGEN SATURATION: 100 % | SYSTOLIC BLOOD PRESSURE: 111 MMHG | BODY MASS INDEX: 20.37 KG/M2 | WEIGHT: 126.76 LBS | HEART RATE: 76 BPM | RESPIRATION RATE: 16 BRPM

## 2022-06-22 DIAGNOSIS — G70.01 MYASTHENIA GRAVIS WITH EXACERBATION (H): Primary | ICD-10-CM

## 2022-06-22 PROCEDURE — G0463 HOSPITAL OUTPT CLINIC VISIT: HCPCS

## 2022-06-22 PROCEDURE — 99214 OFFICE O/P EST MOD 30 MIN: CPT | Performed by: PSYCHIATRY & NEUROLOGY

## 2022-06-22 RX ORDER — PYRIDOSTIGMINE BROMIDE 60 MG/1
60 TABLET ORAL 4 TIMES DAILY
Qty: 120 TABLET | Refills: 5 | Status: SHIPPED | OUTPATIENT
Start: 2022-06-22 | End: 2023-10-16

## 2022-06-22 ASSESSMENT — PAIN SCALES - GENERAL: PAINLEVEL: NO PAIN (0)

## 2022-06-22 NOTE — PATIENT INSTRUCTIONS
Pediatric Neuromuscular Specialty Clinic  [facility]    Contact Numbers:    For questions that are not urgent, contact:  Neha Shahid RN Care Coordinator:  [phone]  Delmy Stokes RN Care Coordinator: [phone]     After hours, or for urgent questions,   contact: [phone]    Schedule or change an appointment:  Jess [phone]    Genetic Counselor: Jyothi Frazier, [phone]    Physical Therapy: Angelic Jimenez, [phone]     Dietician: Brigette العلي, [phone]    Prescription renewals:  Your pharmacy must fax request to [phone]  **Please allow 2-3 days for prescriptions to be authorized.    Scheduling numbers for common referrals:  [phone]  If your physician has ordered an x-ray or MRI, call [phone] to schedule this test.      Today's Visit:     IVIG every 2 weels  Start Mestinon  Follow-up in 3 months at [facility] with pulmonary function test.

## 2022-06-22 NOTE — LETTER
6/22/2022      RE: Tushar Mancia  [address]  [place] [address]     Dear Colleague,    Thank you for the opportunity to participate in the care of your patient, Tushar Mancia, at the [facility] at [facility]. Please see a copy of my visit note below.                 Neuromuscular Clinic      Tushar Mancia MRN# [id number]   YOB: 2000 Age: 21 year old      Date of Visit: Jun 22, 2022    Primary care provider: No Ref-Primary, Physician    Refering physician:[unfilled]      History is obtained from the patient, family and medical record       Interval Change:      Tushar Mancia is a 21 year old female was seen and examined at the neuromuscular clinic on Jun 22, 2022 for a follow up evaluation of previously diagnosed  She has exacerbation for which she was admitted to the hospital for IVIG infusion and her swallowing, chewing and upper extremity function improved.       Myasthenia Gravisd Activities of Daily Living (MG-ADL)    Function None=0 Mild=1 Moderate=2 Severe=3 Score   Talking Normal Intermittent slurring or nasal speech Constant slurring or nasal speech, but can be understood Difficult to understand    Chewing Normal Fatigue with solid food Fatigue with soft food Gastric tube    Swallowing Normal Rare episode of chocking Frequent chocking necessitating changes in diet Gastric tube When she is tired   Breathing Normal Shortness of breath with exertion Shortness of breath at rest Ventilator dependence occasional   Impairment of ability to brush teeth and comb hairs Normal Extra effort, but no rest periods needed Rest periods needed Cannot do one of these functions Not every day, but may need to have periods of rest   Impairment of ability to arise from a chair Normal Mild, sometimes uses arms Moderate, always uses arms Severe, requires assistance    Double vision Normal  Occurs but not daily Daily, but not constant Constant    Eyelid droop Normal Occurs but not daily Daily, but not constant Constant      She is back to working 2 days per week which is her baseline.              Immunizations:     Immunization History   Administered Date(s) Administered     Comvax (HIB/HepB) 2000, 2001, 09/03/2002     DTAP (<7y) 2001, 2001, 03/05/2002, 11/27/2002, 08/30/2006     HEPA 05/06/2003, 05/31/2005     HPV 06/07/2013, 11/26/2013, 04/03/2014     Influenza (H1N1) 12/04/2009, 12/28/2009     Influenza Vaccine, 6+MO IM (QUADRIVALENT W/PRESERVATIVES) 11/27/2002, 11/07/2005, 12/04/2009, 09/22/2011, 11/26/2013     MMR 11/27/2002, 08/30/2006     Meningococcal (Menactra ) 10/25/2017     Meningococcal (Menomune ) 06/07/2013     Pneumococcal (PCV 7) 2001, 2001, 03/05/2002     Poliovirus, inactivated (IPV) 2001, 2001, 03/05/2002, 08/30/2006     Tdap (Adacel,Boostrix) 06/07/2013     Typhoid IM 05/06/2003     Varicella 09/03/2002, 08/30/2006     Yellow Fever 05/06/2003            Allergies:    No Known Allergies          Medications:     Prescription Medications as of 6/22/2022       Rx Number Disp Refills Start End Last Dispensed Date Next Fill Date Owning Pharmacy    albuterol (PROVENTIL) (2.5 MG/3ML) 0.083% neb solution  60 mL 0 11/12/2021        Sig: Take 1 vial (2.5 mg) by nebulization every 4 hours as needed (cough)    Class: Local Print    Route: Nebulization    acetaminophen (TYLENOL) 325 MG tablet    6/7/2022        Sig: Take 2 tablets (650 mg) by mouth every 6 hours as needed for mild pain or fever    Class: Historical    Route: Oral    ibuprofen (ADVIL/MOTRIN) 600 MG tablet    6/7/2022        Sig: Take 1 tablet (600 mg) by mouth every 6 hours as needed for moderate pain    Class: Historical    Route: Oral    ondansetron (ZOFRAN ODT) 4 MG ODT tab  10 tablet 0 6/7/2022    [id number] - [place], [place] - [address] AT [facility] &  "[facility]    Sig: Take 1 tablet (4 mg) by mouth every 6 hours as needed for nausea or vomiting    Class: E-Prescribe    Route: Oral    ORDER FOR DME, SET TO LOCAL PRINT,  1 Device 0 5/21/2015    [place], [place] - [address], [address]    Sig: Equipment being ordered: knee brace    Class: Local Print                Review of Systems:   A comprehensive review of systems was performed and found to be negative except as indicated above.         Physical Exam:     /71   Pulse 76   Temp 98.4  F (36.9  C)   Resp 16   Ht 1.675 m (5' 5.95\")   Wt 57.5 kg (126 lb 12.2 oz)   SpO2 100%   BMI 20.49 kg/m     Physical Exam:   General: NAD  Extremities: Warm, dry  Neurologic:   Mental Status Exam: Alert, awake and easily engaged in interaction.   Cranial Nerves: PERRLA, EOMs intact, no nystagmus, facial movements symmetric with mild weakness in eye and mouth closure,                 facial sensation intact to light touch, hearing intact to conversation, palate and uvula               rise symmetrically, no deviation in uvula or tongue, tongue midline and fully mobile                with no atrophy or fasciculations.    Motor:  Neck flexion weakness 3/5  Mild weakness in shoulder abs]duction 4.5/5 and hip flexion 4/5   Gait:Normal    Myasthenia Gravis Worksheet      Medications      Test item None Mild Moderate Severe Score   Grade 0 1 2 3    Diplopia (lateral gaze), R or L, s 60 11-59 1-10 spontaneous 1   Ptosis (upward gaze), s 60 11-59 1-10 spontaneous 1   Facial weakness   (Eyelid closure) Normal Some resistence No resistance Incomplete 1   Dysarthria with counting 1-50 >50 30-49 10-29 9 0   Swallowing 4 oz water Normal mild  Severe (regurgitation) Not able 0   Right arm held outstretched at 90 deg, s 240  10-89 0-9 2   Left arm held outstretched at 90 deg, s 240  10-89 0-9 2   Vital capacity, % predicted 80 65-79 50-64 <50 ND   Right hand , kgW, M/F 45/30 " 15-44/10-29 5-14/5-9 0-4/0-4 1(23)   Left hand , kgW, man/woman 35/25 15-34/10-24 5-14/5-9 0-4/0-4 1 (20)   Head lift 45 deg supine, s 120  1-29 0 2 (14)   Right leg held outstretched at 45 deg supine, s 100 31-99 1-30 0 2 (15))   Left leg held outstretched at 45 deg supine, s 100 31-99 1-30 0 2 (14)     15/36          Assessment and Recommendations:     Tushar Mancia is a 21 year old female with an 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  She is status post thymectomy.  She had a inpatient treatment with intravenous immunoglobulins.  She had a modest response.  She continues to be fairly symptomatic and would require additional treatment.  Her myasthenia gravis can be classified as class IIIa of mild to moderate severity with relatively high score of QMG 15/36.    Recommendations:  -We will start her back on pyridostigmine 60 mg up to 4 times per day.  I have discussed possibility of using extended release pyridostigmine as well but this will be reassessed in the future.  - We will continue intravenous immunoglobulin as a maintenance therapy every 2 weeks 0.5 g/kg.  Overall, she did not tolerate IVIG treatment well but did have one 1 day of headache.  - Return to clinic in 3 to 4 months.  - I have discussed with the young to that we should consider use of immunosuppression in the future including azathioprine or mycophenolate.  She is still very reluctant to want to start this treatment.  She is.  She is adamant about not using corticosteroids because of side effects.    I have spent at least 30 min on the date of the encounter in chart review, patient visit, review of tests, counseling the patient, documentation about the issues documented above. See note for details.    Sincerely,        Obi Dave MD  Neurology and neuromuscular medicine  [phone]           Patient Care Team:  No  Ref-Primary, Physician as PCP - General  Leda Quinn MD as MD (Pediatric Rheumatology)    Copy to patient  Parent(s) of Tushar Mancia  [address]  [place] [place] [address]

## 2022-06-24 RX ORDER — ALBUTEROL SULFATE 90 UG/1
1-2 AEROSOL, METERED RESPIRATORY (INHALATION)
Status: CANCELLED
Start: 2022-06-24

## 2022-06-24 RX ORDER — METHYLPREDNISOLONE SODIUM SUCCINATE 125 MG/2ML
125 INJECTION, POWDER, LYOPHILIZED, FOR SOLUTION INTRAMUSCULAR; INTRAVENOUS
Status: CANCELLED
Start: 2022-06-24

## 2022-06-24 RX ORDER — DIPHENHYDRAMINE HYDROCHLORIDE 50 MG/ML
50 INJECTION INTRAMUSCULAR; INTRAVENOUS
Status: CANCELLED
Start: 2022-06-24

## 2022-06-24 RX ORDER — EPINEPHRINE 1 MG/ML
0.3 INJECTION, SOLUTION, CONCENTRATE INTRAVENOUS EVERY 5 MIN PRN
Status: CANCELLED | OUTPATIENT
Start: 2022-06-24

## 2022-06-24 RX ORDER — HEPARIN SODIUM,PORCINE 10 UNIT/ML
5 VIAL (ML) INTRAVENOUS
Status: CANCELLED | OUTPATIENT
Start: 2022-06-24

## 2022-06-24 RX ORDER — NALOXONE HYDROCHLORIDE 0.4 MG/ML
0.2 INJECTION, SOLUTION INTRAMUSCULAR; INTRAVENOUS; SUBCUTANEOUS
Status: CANCELLED | OUTPATIENT
Start: 2022-06-24

## 2022-06-24 RX ORDER — HEPARIN SODIUM (PORCINE) LOCK FLUSH IV SOLN 100 UNIT/ML 100 UNIT/ML
5 SOLUTION INTRAVENOUS
Status: CANCELLED | OUTPATIENT
Start: 2022-06-24

## 2022-06-24 RX ORDER — ALBUTEROL SULFATE 0.83 MG/ML
2.5 SOLUTION RESPIRATORY (INHALATION)
Status: CANCELLED | OUTPATIENT
Start: 2022-06-24

## 2022-06-24 RX ORDER — ACETAMINOPHEN 325 MG/1
650 TABLET ORAL ONCE
Status: CANCELLED
Start: 2022-06-24

## 2022-06-24 RX ORDER — MEPERIDINE HYDROCHLORIDE 25 MG/ML
25 INJECTION INTRAMUSCULAR; INTRAVENOUS; SUBCUTANEOUS EVERY 30 MIN PRN
Status: CANCELLED | OUTPATIENT
Start: 2022-06-24

## 2022-06-24 RX ORDER — DIPHENHYDRAMINE HCL 25 MG
50 CAPSULE ORAL ONCE
Status: CANCELLED
Start: 2022-06-24

## 2022-06-28 ENCOUNTER — PATIENT OUTREACH (OUTPATIENT)
Dept: NURSING | Facility: CLINIC | Age: 22
End: 2022-06-28

## 2022-06-28 DIAGNOSIS — Z71.89 OTHER SPECIFIED COUNSELING: ICD-10-CM

## 2022-06-28 NOTE — PROGRESS NOTES
Clinic Care Coordination Contact    Clinic Care Coordination Contact  OUTREACH    Referral Information:   RN CC outreach and spoke with patient   Patient notes that she is considering finding Behavioral Health Provider  Patient notes that she would like to complete assessment next week as she forgot about today's schedule visit     Of note, pt may have PCP outside of N   RN CC discussed follow up with [facility] provider to consider support within care system     Patient will update RN CC at rescheduled outreach next week if she will be seeking support outside of network.            Chief Complaint   Patient presents with     Clinic Care Coordination - Initial        Universal Utilization:     Utilization    Hospital Admissions  1             ED Visits  5             No Show Count (past year)  1                Current as of: 6/28/2022  3:28 PM          Goals:       Patient/Caregiver understanding:Pt reports understanding and denies any additional questions or concerns at this times. CC engaged in AIDET communication during encounter.         Future Appointments              In 1 week SPRS CCC RN [facility] SPRS    In 1 week UR INFUSION NURSE; UR [facility]fusion [facility]    In 3 months [facility] PFT LAB [facility] CLIN    In 3 months Obi Dave MD [facility] Pediatric [facility]ty [facility]          Plan:Patient will review options for PCP and update RN CC at next outreach

## 2022-07-06 ENCOUNTER — PATIENT OUTREACH (OUTPATIENT)
Dept: NURSING | Facility: CLINIC | Age: 22
End: 2022-07-06

## 2022-07-06 NOTE — PROGRESS NOTES
Clinic Care Coordination Contact  [facility]/Voicemail       Clinical Data: Care Coordinator Outreach  Outreach attempted x 1.  Left message on patient's voicemail with call back information and requested return call.  Plan: Care Coordinator will send care coordination introduction letter with care coordinator contact information and explanation of care coordination services via [employer]. Care Coordinator will do no further outreaches at this time.

## 2022-07-11 ENCOUNTER — INFUSION THERAPY VISIT (OUTPATIENT)
Dept: INFUSION THERAPY | Facility: CLINIC | Age: 22
End: 2022-07-11
Attending: PSYCHIATRY & NEUROLOGY
Payer: COMMERCIAL

## 2022-07-11 VITALS
WEIGHT: 124.8 LBS | OXYGEN SATURATION: 100 % | BODY MASS INDEX: 20.18 KG/M2 | HEART RATE: 78 BPM | TEMPERATURE: 98 F | SYSTOLIC BLOOD PRESSURE: 110 MMHG | DIASTOLIC BLOOD PRESSURE: 66 MMHG | RESPIRATION RATE: 16 BRPM

## 2022-07-11 DIAGNOSIS — G70.01 MYASTHENIA GRAVIS WITH EXACERBATION (H): Primary | ICD-10-CM

## 2022-07-11 PROCEDURE — 250N000011 HC RX IP 250 OP 636: Performed by: PSYCHIATRY & NEUROLOGY

## 2022-07-11 PROCEDURE — 96366 THER/PROPH/DIAG IV INF ADDON: CPT

## 2022-07-11 PROCEDURE — 96365 THER/PROPH/DIAG IV INF INIT: CPT

## 2022-07-11 PROCEDURE — 250N000013 HC RX MED GY IP 250 OP 250 PS 637: Performed by: PSYCHIATRY & NEUROLOGY

## 2022-07-11 PROCEDURE — 96375 TX/PRO/DX INJ NEW DRUG ADDON: CPT

## 2022-07-11 RX ORDER — ACETAMINOPHEN 325 MG/1
650 TABLET ORAL ONCE
Status: CANCELLED
Start: 2022-07-11

## 2022-07-11 RX ORDER — EPINEPHRINE 1 MG/ML
0.3 INJECTION, SOLUTION, CONCENTRATE INTRAVENOUS EVERY 5 MIN PRN
Status: CANCELLED | OUTPATIENT
Start: 2022-07-11

## 2022-07-11 RX ORDER — HEPARIN SODIUM,PORCINE 10 UNIT/ML
5 VIAL (ML) INTRAVENOUS
Status: CANCELLED | OUTPATIENT
Start: 2022-07-11

## 2022-07-11 RX ORDER — DIPHENHYDRAMINE HCL 25 MG
50 CAPSULE ORAL ONCE
Status: CANCELLED
Start: 2022-07-11

## 2022-07-11 RX ORDER — ALBUTEROL SULFATE 90 UG/1
1-2 AEROSOL, METERED RESPIRATORY (INHALATION)
Status: CANCELLED
Start: 2022-07-11

## 2022-07-11 RX ORDER — NALOXONE HYDROCHLORIDE 0.4 MG/ML
0.2 INJECTION, SOLUTION INTRAMUSCULAR; INTRAVENOUS; SUBCUTANEOUS
Status: CANCELLED | OUTPATIENT
Start: 2022-07-11

## 2022-07-11 RX ORDER — HEPARIN SODIUM (PORCINE) LOCK FLUSH IV SOLN 100 UNIT/ML 100 UNIT/ML
5 SOLUTION INTRAVENOUS
Status: CANCELLED | OUTPATIENT
Start: 2022-07-11

## 2022-07-11 RX ORDER — MEPERIDINE HYDROCHLORIDE 25 MG/ML
25 INJECTION INTRAMUSCULAR; INTRAVENOUS; SUBCUTANEOUS EVERY 30 MIN PRN
Status: CANCELLED | OUTPATIENT
Start: 2022-07-11

## 2022-07-11 RX ORDER — DIPHENHYDRAMINE HYDROCHLORIDE 50 MG/ML
50 INJECTION INTRAMUSCULAR; INTRAVENOUS
Status: CANCELLED
Start: 2022-07-11

## 2022-07-11 RX ORDER — DIPHENHYDRAMINE HCL 25 MG
50 CAPSULE ORAL ONCE
Status: COMPLETED | OUTPATIENT
Start: 2022-07-11 | End: 2022-07-11

## 2022-07-11 RX ORDER — METHYLPREDNISOLONE SODIUM SUCCINATE 125 MG/2ML
125 INJECTION, POWDER, LYOPHILIZED, FOR SOLUTION INTRAMUSCULAR; INTRAVENOUS
Status: CANCELLED
Start: 2022-07-11

## 2022-07-11 RX ORDER — ACETAMINOPHEN 325 MG/1
650 TABLET ORAL ONCE
Status: COMPLETED | OUTPATIENT
Start: 2022-07-11 | End: 2022-07-11

## 2022-07-11 RX ORDER — ALBUTEROL SULFATE 0.83 MG/ML
2.5 SOLUTION RESPIRATORY (INHALATION)
Status: CANCELLED | OUTPATIENT
Start: 2022-07-11

## 2022-07-11 RX ADMIN — DIPHENHYDRAMINE HYDROCHLORIDE 50 MG: 25 CAPSULE ORAL at 10:48

## 2022-07-11 RX ADMIN — IMMUNE GLOBULIN INFUSION (HUMAN) 30 G: 100 INJECTION, SOLUTION INTRAVENOUS; SUBCUTANEOUS at 11:30

## 2022-07-11 RX ADMIN — HYDROCORTISONE SODIUM SUCCINATE 100 MG: 100 INJECTION, POWDER, FOR SOLUTION INTRAMUSCULAR; INTRAVENOUS at 10:49

## 2022-07-11 RX ADMIN — ACETAMINOPHEN 325MG 650 MG: 325 TABLET ORAL at 10:48

## 2022-07-11 NOTE — PROGRESS NOTES
Infusion Nursing Note:  Tushar Mancia presents today for IVIG.    Patient seen by provider today: No   present during visit today: Not Applicable.    Note: Tylenol and Benadryl given PO.  Solu cortef given IV.  IVIG titrated up per order.    Intravenous Access:  Peripheral IV placed.    Treatment Conditions:  Biological Infusion Checklist:  ~~~ NOTE: If the patient answers yes to any of the questions below, hold the infusion and contact ordering provider or on-call provider.    1. Have you recently had an elevated temperature, fever, chills, productive cough, coughing for 3 weeks or longer or hemoptysis, abnormal vital signs, night sweats,  chest pain or have you noticed a decrease in your appetite, unexplained weight loss or fatigue? No  2. Do you have any open wounds or new incisions? No  3. Do you have any recent or upcoming hospitalizations, surgeries or dental procedures? No  4. Do you currently have or recently have had any signs of illness or infection or are you on any antibiotics? No  5. Have you had any new, sudden or worsening abdominal pain? No  6. Have you or anyone in your household received a live vaccination in the past 4 weeks? Please note:  No live vaccines while on biologic/chemotherapy until 6 months after the last treatment.  Patient can receive the flu vaccine (shot only) and the pneumovax.  It is optimal for the patient to get these vaccines mid cycle, but they can be given at any time as long as it is not on the day of the infusion. No  7. Have you recently been diagnosed with any new nervous system diseases (ie. Multiple sclerosis, Guillain [place], seizures, neurological changes) or cancer diagnosis? No  8. Are you on any form of radiation or chemotherapy? No  9. Are you pregnant or breast feeding or do you have plans of pregnancy in the future? No  10. Have you been having any signs of worsening depression or suicidal ideations?  (benlysta only) No  11. Have there been any  other new onset medical symptoms? No      Post Infusion Assessment:  Patient tolerated infusion without incident.  No evidence of extravasations.  Access discontinued per protocol.  Biologic Infusion Post Education: Call the triage nurse at your clinic or seek medical attention if you have chills and/or temperature greater than or equal to 100.5, uncontrolled nausea/vomiting, diarrhea, constipation, dizziness, shortness of breath, chest pain, heart palpitations, weakness or any other new or concerning symptoms, questions or concerns.  You cannot have any live virus vaccines prior to or during treatment or up to 6 months post infusion.  If you have an upcoming surgery, medical procedure or dental procedure during treatment, this should be discussed with your ordering physician and your surgeon/dentist.  If you are having any concerning symptom, if you are unsure if you should get your next infusion or wish to speak to a provider before your next infusion, please call your care coordinator or triage nurse at your clinic to notify them so we can adequately serve you.     Discharge Plan:   Patient and/or family verbalized understanding of discharge instructions and all questions answered.  Patient discharged in stable condition accompanied by: self.      ROSALBA BOWIE RN

## 2022-07-12 ENCOUNTER — TELEPHONE (OUTPATIENT)
Dept: NEUROLOGY | Facility: CLINIC | Age: 22
End: 2022-07-12

## 2022-07-12 NOTE — TELEPHONE ENCOUNTER
Follow-up with patient post IVIG infusion. Patient is doing well except reports of a mild headache. Encouraged adequate hydration and rest. Confirmed with patient that next IVIG infusion is scheduled. Encouraged patient to make multiple appointments at a time to ensure she is on-time with infusions (every 2 weeks). Confirmed follow-up appointment with Dr. Dave on 9/28/22 at the [facility].     Patient informed RNCC with she will be travelling to [place] on July 24th-30th. RNCC advised patient that is immunocompromised and at increased risk for infection. Discussed increased hand hygiene, mask wearing, and caution with food and drink. Message send to Dr. Dave to discuss travel plans and medical advisement.     Plan for RNCC to follow-up with patient concerning travel plans and Dr. Dave's advise.

## 2022-07-13 NOTE — TELEPHONE ENCOUNTER
"Spoke to patient and relayed message from Dr. Dave, \"This should be fine. She may have some issues with excessive heat which can be hard to tolerate. She should avoid it as much as she can.\"    Patient verbalized understanding.     "

## 2022-08-01 ENCOUNTER — INFUSION THERAPY VISIT (OUTPATIENT)
Dept: INFUSION THERAPY | Facility: CLINIC | Age: 22
End: 2022-08-01
Attending: PSYCHIATRY & NEUROLOGY
Payer: COMMERCIAL

## 2022-08-01 VITALS
OXYGEN SATURATION: 100 % | SYSTOLIC BLOOD PRESSURE: 124 MMHG | WEIGHT: 127.3 LBS | RESPIRATION RATE: 16 BRPM | TEMPERATURE: 98.1 F | DIASTOLIC BLOOD PRESSURE: 70 MMHG | BODY MASS INDEX: 20.58 KG/M2 | HEART RATE: 87 BPM

## 2022-08-01 DIAGNOSIS — G70.01 MYASTHENIA GRAVIS WITH EXACERBATION (H): Primary | ICD-10-CM

## 2022-08-01 PROCEDURE — 96366 THER/PROPH/DIAG IV INF ADDON: CPT

## 2022-08-01 PROCEDURE — 250N000013 HC RX MED GY IP 250 OP 250 PS 637: Performed by: PSYCHIATRY & NEUROLOGY

## 2022-08-01 PROCEDURE — 250N000011 HC RX IP 250 OP 636: Performed by: PSYCHIATRY & NEUROLOGY

## 2022-08-01 PROCEDURE — 96375 TX/PRO/DX INJ NEW DRUG ADDON: CPT

## 2022-08-01 PROCEDURE — 96365 THER/PROPH/DIAG IV INF INIT: CPT

## 2022-08-01 RX ORDER — ALBUTEROL SULFATE 90 UG/1
1-2 AEROSOL, METERED RESPIRATORY (INHALATION)
Status: CANCELLED
Start: 2022-08-01

## 2022-08-01 RX ORDER — DIPHENHYDRAMINE HCL 25 MG
50 CAPSULE ORAL ONCE
Status: COMPLETED | OUTPATIENT
Start: 2022-08-01 | End: 2022-08-01

## 2022-08-01 RX ORDER — DIPHENHYDRAMINE HYDROCHLORIDE 50 MG/ML
50 INJECTION INTRAMUSCULAR; INTRAVENOUS
Status: CANCELLED
Start: 2022-08-01

## 2022-08-01 RX ORDER — NALOXONE HYDROCHLORIDE 0.4 MG/ML
0.2 INJECTION, SOLUTION INTRAMUSCULAR; INTRAVENOUS; SUBCUTANEOUS
Status: CANCELLED | OUTPATIENT
Start: 2022-08-01

## 2022-08-01 RX ORDER — METHYLPREDNISOLONE SODIUM SUCCINATE 125 MG/2ML
125 INJECTION, POWDER, LYOPHILIZED, FOR SOLUTION INTRAMUSCULAR; INTRAVENOUS
Status: CANCELLED
Start: 2022-08-01

## 2022-08-01 RX ORDER — HEPARIN SODIUM (PORCINE) LOCK FLUSH IV SOLN 100 UNIT/ML 100 UNIT/ML
5 SOLUTION INTRAVENOUS
Status: CANCELLED | OUTPATIENT
Start: 2022-08-01

## 2022-08-01 RX ORDER — ALBUTEROL SULFATE 0.83 MG/ML
2.5 SOLUTION RESPIRATORY (INHALATION)
Status: CANCELLED | OUTPATIENT
Start: 2022-08-01

## 2022-08-01 RX ORDER — DIPHENHYDRAMINE HCL 25 MG
50 CAPSULE ORAL ONCE
Status: CANCELLED
Start: 2022-08-01

## 2022-08-01 RX ORDER — ACETAMINOPHEN 325 MG/1
650 TABLET ORAL ONCE
Status: COMPLETED | OUTPATIENT
Start: 2022-08-01 | End: 2022-08-01

## 2022-08-01 RX ORDER — ACETAMINOPHEN 325 MG/1
650 TABLET ORAL ONCE
Status: CANCELLED
Start: 2022-08-01

## 2022-08-01 RX ORDER — MEPERIDINE HYDROCHLORIDE 25 MG/ML
25 INJECTION INTRAMUSCULAR; INTRAVENOUS; SUBCUTANEOUS EVERY 30 MIN PRN
Status: CANCELLED | OUTPATIENT
Start: 2022-08-01

## 2022-08-01 RX ORDER — HEPARIN SODIUM,PORCINE 10 UNIT/ML
5 VIAL (ML) INTRAVENOUS
Status: CANCELLED | OUTPATIENT
Start: 2022-08-01

## 2022-08-01 RX ORDER — EPINEPHRINE 1 MG/ML
0.3 INJECTION, SOLUTION, CONCENTRATE INTRAVENOUS EVERY 5 MIN PRN
Status: CANCELLED | OUTPATIENT
Start: 2022-08-01

## 2022-08-01 RX ORDER — PYRIDOSTIGMINE BROMIDE 60 MG/1
60 TABLET ORAL 3 TIMES DAILY
COMMUNITY
End: 2023-10-16

## 2022-08-01 RX ADMIN — IMMUNE GLOBULIN INFUSION (HUMAN) 30 G: 100 INJECTION, SOLUTION INTRAVENOUS; SUBCUTANEOUS at 09:58

## 2022-08-01 RX ADMIN — HYDROCORTISONE SODIUM SUCCINATE 100 MG: 100 INJECTION, POWDER, FOR SOLUTION INTRAMUSCULAR; INTRAVENOUS at 09:30

## 2022-08-01 RX ADMIN — ACETAMINOPHEN 650 MG: 325 TABLET, FILM COATED ORAL at 09:28

## 2022-08-01 RX ADMIN — DIPHENHYDRAMINE HYDROCHLORIDE 50 MG: 25 CAPSULE ORAL at 09:28

## 2022-08-01 ASSESSMENT — PAIN SCALES - GENERAL: PAINLEVEL: NO PAIN (0)

## 2022-08-01 NOTE — PROGRESS NOTES
Infusion Nursing Note:  Tushar Mancia presents today for IVIG.    Patient seen by provider today: No   present during visit today: Not Applicable.    Note: Patient states she had a slight headache following last dose of IVIG.  Denies reactions, fevers, illness, major or local infection, no antibiotics, productive cough, or recent surgery.    Intravenous Access:  Peripheral IV placed.    Treatment Conditions:  Premeds given and IVIG titrated per orders.    Post Infusion Assessment:  Patient tolerated infusion without incident.    Blood return noted pre and post infusion.  Site patent and intact, free from redness, edema or discomfort.  No evidence of extravasations.  Access discontinued per protocol.     Discharge Plan:   Patient discharged in stable condition accompanied by: self.  Departure Mode: Ambulatory.  Will return to clinic in 2 weeks.    Brigette King RN

## 2022-09-22 ENCOUNTER — TELEPHONE (OUTPATIENT)
Dept: INFUSION THERAPY | Facility: CLINIC | Age: 22
End: 2022-09-22

## 2022-09-28 ENCOUNTER — VIRTUAL VISIT (OUTPATIENT)
Dept: PEDIATRIC NEUROLOGY | Facility: CLINIC | Age: 22
End: 2022-09-28
Attending: PSYCHIATRY & NEUROLOGY
Payer: COMMERCIAL

## 2022-09-28 DIAGNOSIS — G70.01 MYASTHENIA GRAVIS WITH EXACERBATION (H): Primary | ICD-10-CM

## 2022-09-28 PROCEDURE — 99213 OFFICE O/P EST LOW 20 MIN: CPT | Performed by: PSYCHIATRY & NEUROLOGY

## 2022-09-28 NOTE — PROGRESS NOTES
Neuromuscular Telephone Clinic Visit     Tushar Mancia MRN# [id number]   YOB: 2000 Age: 22 year old      Date of Visit: Sep 28, 2022    Primary care provider: No Ref-Primary, Physician        History is obtained from the patient, family and medical record       Interval Change:      Tushar Mancia is a 22 year old female was seen and examined at the neuromuscular clinic on Sep 28, 2022 for a follow up evaluation of previously diagnosed myasthenia gravis. She received treatment with IVIG and pyridostigmin. She is doing well now and feels that stopping her IVIG is good for now. She reports no significant symptoms related to myasthenia gravis.            Immunizations:     Immunization History   Administered Date(s) Administered     Comvax (HIB/HepB) 2001, 2001, 09/03/2002     DTAP (<7y) 2001, 2001, 03/05/2002, 11/27/2002, 08/30/2006     HEPA 05/06/2003, 05/31/2005     HPV 06/07/2013, 11/26/2013, 04/03/2014     Influenza (H1N1) 12/04/2009, 12/28/2009     Influenza Vaccine, 6+MO IM (QUADRIVALENT W/PRESERVATIVES) 11/27/2002, 11/07/2005, 12/04/2009, 09/22/2011, 11/26/2013     MMR 11/27/2002, 08/30/2006     Meningococcal (Menactra ) 10/25/2017     Meningococcal (Menomune ) 06/07/2013     Pneumococcal (PCV 7) 2001, 2001, 03/05/2002     Poliovirus, inactivated (IPV) 2001, 2001, 03/05/2002, 08/30/2006     Tdap (Adacel,Boostrix) 06/07/2013     Typhoid IM 05/06/2003     Varicella 09/03/2002, 08/30/2006     Yellow Fever 05/06/2003            Allergies:    No Known Allergies          Medications:     Prescription Medications as of 9/28/2022       Rx Number Disp Refills Start End Last Dispensed Date Next Fill Date Owning Pharmacy    acetaminophen (TYLENOL) 325 MG tablet    6/7/2022        Sig: Take 650 mg by mouth every 6 hours as needed for mild pain or fever    Class: Historical    Route: Oral    albuterol (PROVENTIL) (2.5 MG/3ML) 0.083% neb  solution  60 mL 0 11/12/2021        Sig: Take 1 vial (2.5 mg) by nebulization every 4 hours as needed (cough)    Class: Local Print    Route: Nebulization    ibuprofen (ADVIL/MOTRIN) 600 MG tablet    6/7/2022        Sig: Take 600 mg by mouth every 6 hours as needed for moderate pain    Class: Historical    Route: Oral    ondansetron (ZOFRAN ODT) 4 MG ODT tab  10 tablet 0 6/7/2022    [id number] - [place], [place] - [address] AT [facility] & [facility]    Sig: Take 1 tablet (4 mg) by mouth every 6 hours as needed for nausea or vomiting    Class: E-Prescribe    Route: Oral    ORDER FOR DME, SET TO LOCAL PRINT,  1 Device 0 5/21/2015    [facility] - [place] - [address], [address]    Sig: Equipment being ordered: knee brace    Class: Local Print    pyridostigmine (MESTINON) 60 MG tablet        [id number] - [place], [place] - [address] AT [facility]    Sig: Take 60 mg by mouth 3 times daily    Class: Historical    Route: Oral    pyridostigmine (MESTINON) 60 MG tablet  120 tablet 5 6/22/2022 7/22/2022   [id number] - [place], [place] - [address] AT [facility]    Sig: Take 1 tablet (60 mg) by mouth 4 times daily for 30 days    Class: E-Prescribe    Route: Oral               Assessment and Recommendations:     Tushar Mancia is a 22 year old female with acquired anti-AChR Ab positive myasthenia gravis is well controlled after course of treatment with IVIG.     Recommendations:  - Ok to d/c IVIG   -Continue Pyridostigmine as needed.  -Return to clinic in 6 months or sooner. Tushar is a 22 year old who is being evaluated via a billable video visit.      Tushar is a 22 year old who is being evaluated via a billable telephone visit.      Telephone-Visit Details     Start Time: 11:51 AM    Type of service:  Telephone Visit    End Time:12:05 PM    Originating Location (pt. Location):  Home    Distant Location (provider location):  [facility]       I have spent at least 15 min on the date of the encounter in chart review, patient visit, review of tests, counseling the patient, documentation about the issues documented above. See note for details.    Sincerely,        Obi Dave MD  Neurology and neuromuscular medicine  [phone]         CC  Patient Care Team:  No Ref-Primary, Physician as PCP - General  Leda Quinn MD as MD (Pediatric Rheumatology)  Obi Dave MD as MD (Psychiatry & Neurology - Child & Adolescent Psychiatry)  Obi Dave MD as Assigned Neuroscience Provider      Copy to patient  AYYAIR VARGAS TYAURORAANNALISE  [address]  [facility] [address]

## 2022-09-28 NOTE — LETTER
9/28/2022      RE: Tushar Mancia  [address]  [facility] [address]     Dear Colleague,    Thank you for the opportunity to participate in the care of your patient, Tushar Mancia, at the [facility] at [facility]. Please see a copy of my visit note below.                 Neuromuscular Telephone Clinic Visit     Tushar Mancia MRN# [id number]   YOB: 2000 Age: 22 year old      Date of Visit: Sep 28, 2022    Primary care provider: No Ref-Primary, Physician        History is obtained from the patient, family and medical record       Interval Change:      Tushar Mancia is a 22 year old female was seen and examined at the neuromuscular clinic on Sep 28, 2022 for a follow up evaluation of previously diagnosed myasthenia gravis. She received treatment with IVIG and pyridostigmin. She is doing well now and feels that stopping her IVIG is good for now. She reports no significant symptoms related to myasthenia gravis.            Immunizations:     Immunization History   Administered Date(s) Administered     Comvax (HIB/HepB) 2001, 2001, 09/03/2002     DTAP (<7y) 2001, 2001, 03/05/2002, 11/27/2002, 08/30/2006     HEPA 05/06/2003, 05/31/2005     HPV 06/07/2013, 11/26/2013, 04/03/2014     Influenza (H1N1) 12/04/2009, 12/28/2009     Influenza Vaccine, 6+MO IM (QUADRIVALENT W/PRESERVATIVES) 11/27/2002, 11/07/2005, 12/04/2009, 09/22/2011, 11/26/2013     MMR 11/27/2002, 08/30/2006     Meningococcal (Menactra ) 10/25/2017     Meningococcal (Menomune ) 06/07/2013     Pneumococcal (PCV 7) 2001, 2001, 03/05/2002     Poliovirus, inactivated (IPV) 2001, 2001, 03/05/2002, 08/30/2006     Tdap (Adacel,Boostrix) 06/07/2013     Typhoid IM 05/06/2003     Varicella 09/03/2002, 08/30/2006     Yellow Fever 05/06/2003            Allergies:    No Known Allergies           Medications:     Prescription Medications as of 9/28/2022       Rx Number Disp Refills Start End Last Dispensed Date Next Fill Date Owning Pharmacy    acetaminophen (TYLENOL) 325 MG tablet    6/7/2022        Sig: Take 650 mg by mouth every 6 hours as needed for mild pain or fever    Class: Historical    Route: Oral    albuterol (PROVENTIL) (2.5 MG/3ML) 0.083% neb solution  60 mL 0 11/12/2021        Sig: Take 1 vial (2.5 mg) by nebulization every 4 hours as needed (cough)    Class: Local Print    Route: Nebulization    ibuprofen (ADVIL/MOTRIN) 600 MG tablet    6/7/2022        Sig: Take 600 mg by mouth every 6 hours as needed for moderate pain    Class: Historical    Route: Oral    ondansetron (ZOFRAN ODT) 4 MG ODT tab  10 tablet 0 6/7/2022    [id number] - [place], [place] - [address] AT [facility] & [facility]    Sig: Take 1 tablet (4 mg) by mouth every 6 hours as needed for nausea or vomiting    Class: E-Prescribe    Route: Oral    ORDER FOR DME, SET TO LOCAL PRINT,  1 Device 0 5/21/2015    [facility] - [place], [place] - [address], [address]    Sig: Equipment being ordered: knee brace    Class: Local Print    pyridostigmine (MESTINON) 60 MG tablet        [id number] - [place], [place] - [address] AT [facility] & [facility]    Sig: Take 60 mg by mouth 3 times daily    Class: Historical    Route: Oral    pyridostigmine (MESTINON) 60 MG tablet  120 tablet 5 6/22/2022 7/22/2022   [id number] - [place], [place] - [address] AT [facility] & [facility]    Sig: Take 1 tablet (60 mg) by mouth 4 times daily for 30 days    Class: E-Prescribe    Route: Oral               Assessment and Recommendations:     Tushar Mancia is a 22 year old female with acquired anti-AChR Ab positive myasthenia gravis is well controlled after course of treatment with IVIG.     Recommendations:  - Ok to d/c IVIG   -Continue Pyridostigmine as  needed.  -Return to clinic in 6 months or sooner. Tushar is a 22 year old who is being evaluated via a billable video visit.      Tushar is a 22 year old who is being evaluated via a billable telephone visit.      Telephone-Visit Details     Start Time: 11:51 AM    Type of service:  Telephone Visit    End Time:12:05 PM    Originating Location (pt. Location): Home    Distant Location (provider location):  [facility]       I have spent at least 15 min on the date of the encounter in chart review, patient visit, review of tests, counseling the patient, documentation about the issues documented above. See note for details.    Sincerely,        Obi Dave MD  Neurology and neuromuscular medicine  [phone]         CC  Patient Care Team:  No Ref-Primary, Physician as PCP - Leda Bryant MD as MD (Pediatric Rheumatology)    Copy to patient  TUSHAR VARGAS ARTHUR  [address]  [facility] [address]

## 2022-09-28 NOTE — PATIENT INSTRUCTIONS
Pediatric Neuromuscular Specialty Clinic  [facility]    Contact Numbers:    For questions that are not urgent, contact:  Neha Shahid RN Care Coordinator:  [phone]  Delmy Stokes RN Care Coordinator: [phone]     After hours, or for urgent questions,   contact: [phone]    Schedule or change an appointment:  Jess [phone]    Prescription renewals:  Your pharmacy must fax request to [phone]  **Please allow 2-3 days for prescriptions to be authorized.      Today's Visit:   Stop IVIG  Follow-up in 6-months or sooner

## 2022-11-17 ENCOUNTER — TRANSFERRED RECORDS (OUTPATIENT)
Dept: MULTI SPECIALTY CLINIC | Facility: CLINIC | Age: 22
End: 2022-11-17

## 2022-11-17 LAB — PAP-ABSTRACT: NORMAL

## 2022-11-20 ENCOUNTER — HEALTH MAINTENANCE LETTER (OUTPATIENT)
Age: 22
End: 2022-11-20

## 2023-06-12 ENCOUNTER — TELEPHONE (OUTPATIENT)
Dept: NEUROLOGY | Facility: CLINIC | Age: 23
End: 2023-06-12
Payer: COMMERCIAL

## 2023-06-12 NOTE — TELEPHONE ENCOUNTER
Health Call Center    Phone Message    May a detailed message be left on voicemail: yes     Reason for Call: Symptoms or Concerns     If patient has red-flag symptoms, warm transfer to triage line    Current symptom or concern: Patient states that she is having tension in her head, migraines as well as uncontrollable shaking in her legs.     Symptoms have been present for:  A few weeks for the tension in her head as well as the migraines. Its been a few months for the uncontrollable shaking in her legs.     Has patient previously been seen for this? No    By Obi Dave     Are there any new or worsening symptoms? Yes:     Patient called and stated that she is experiencing tension in her head, migraines as well as uncontrollable shaking in her legs, especially when she is walking down stairs. Patient is requesting a call back.  Please call back an advise at [phone].       Action Taken: Message routed to:  [facility] Neurology     Travel Screening: Not Applicable

## 2023-06-13 NOTE — TELEPHONE ENCOUNTER
Attempted to call back Tushar. No answer and unable to leave a message. Will attempt again at a later date.     Raquel Dang RN

## 2023-06-14 NOTE — TELEPHONE ENCOUNTER
"Spoke with Tushar. She endorses a new symptom of headaches but she describes them as \"tension\". She said the tension feeling comes and goes throughout the day.  OTC medication does not help. The headaches started a few weeks ago. She also endorses shakiness in her legs when she walks down stairs but denies any weakness.  Denies any MG symptoms (no diplopia, ptosis, difficulty chewing, no limb weakness) but does state that her speech is \"starting to get impaired\". Denies any problems with swallowing. Has not had a clinic appointment since 9/22. Currently on no MG medication (stopped mestinon, she is unsure why).  Routing to Dr. Dave, please advise.    Raquel Dang RN    "

## 2023-06-15 NOTE — TELEPHONE ENCOUNTER
Per Raquel Dang, RN - called to schedule follow up with Dr. Dave.    Patient Contacted to schedule the following:    Appointment type: Return Myasthenia Gravis  Provider: Dr. Dave  Return date: First available  Specialty phone number: [phone]  Additional appointment(s) needed: N/A  Additonal Notes: N/A    Spoke with patient, scheduled virtual follow up on 7/17 at 11:00am.     Casey Montes on 6/15/2023 at 4:51 PM

## 2023-07-08 ENCOUNTER — HEALTH MAINTENANCE LETTER (OUTPATIENT)
Age: 23
End: 2023-07-08

## 2023-10-16 ENCOUNTER — VIRTUAL VISIT (OUTPATIENT)
Dept: NEUROLOGY | Facility: CLINIC | Age: 23
End: 2023-10-16
Payer: COMMERCIAL

## 2023-10-16 VITALS — HEIGHT: 65 IN | WEIGHT: 125 LBS | BODY MASS INDEX: 20.83 KG/M2

## 2023-10-16 DIAGNOSIS — G70.00 MYASTHENIA GRAVIS WITHOUT EXACERBATION (H): Primary | ICD-10-CM

## 2023-10-16 PROCEDURE — 99214 OFFICE O/P EST MOD 30 MIN: CPT | Mod: VID | Performed by: PSYCHIATRY & NEUROLOGY

## 2023-10-16 RX ORDER — PYRIDOSTIGMINE BROMIDE 60 MG/1
60 TABLET ORAL 3 TIMES DAILY
Qty: 90 TABLET | Refills: 3 | Status: SHIPPED | OUTPATIENT
Start: 2023-10-16 | End: 2024-03-20

## 2023-10-16 ASSESSMENT — PAIN SCALES - GENERAL: PAINLEVEL: NO PAIN (0)

## 2023-10-16 NOTE — NURSING NOTE
Is the patient currently in the state of [place]? YES    Visit mode:VIDEO    If the visit is dropped, the patient can be reconnected by: VIDEO VISIT: Text to cell phone:   Telephone Information:   Mobile [phone]       Will anyone else be joining the visit? NO  (If patient encounters technical issues they should call [phone]    How would you like to obtain your AVS? MyChart    Are changes needed to the allergy or medication list? Pt stated no med changes    Reason for visit: RECHECK (Myasthenia gravis - follow up)    Tatyana SANDS

## 2023-10-16 NOTE — LETTER
10/16/2023       RE: Tushar Mancia  [address]  [facility] [address]     Dear Colleague,    Thank you for referring your patient, Tushar Mancia, to the [facility] [place] [facility]. Please see a copy of my visit note below.               [facility]                  Muscular Dystrophy Clinic Note     Tushar Mancia MRN# [id number]   YOB: 2000 Age: 23 year old      Date of Visit: Oct 16, 2023    Primary care provider: No Ref-Primary, Physician    Refering physician:         Chief Complaint:     Follow up       History is obtained from the patient, family and medical record       Interval Change:      Tushar Mancia is a 23 year old female was seen and examined at the muscular dystrophy clinic on Oct 16, 2023 for evaluation of myasthenia gravis.      Myasthenia Gravisd Activities of Daily Living (MG-ADL)    Function None=0 Mild=1 Moderate=2 Severe=3 Score   Talking Normal Intermittent slurring or nasal speech Constant slurring or nasal speech, but can be understood Difficult to understand 1   Chewing Normal Fatigue with solid food Fatigue with soft food Gastric tube 0   Swallowing Normal Rare episode of chocking Frequent chocking necessitating changes in diet Gastric tube 1   Breathing Normal Shortness of breath with exertion Shortness of breath at rest Ventilator dependence 0   Impairment of ability to brush teeth and comb hairs Normal Extra effort, but no rest periods needed Rest periods needed Cannot do one of these functions 0   Impairment of ability to arise from a chair Normal Mild, sometimes uses arms Moderate, always uses arms Severe, requires assistance 0   Double vision Normal Occurs but not daily Daily, but not constant Constant 1   Eyelid droop Normal Occurs but not daily Daily, but not constant Constant    She sometimes shakes when walks downstairs.  Swelling in  legs towards the end of the day. It is associated with discomfort.     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     She is not taking Mestinon right now.               Allergies:    No Known Allergies          Medications:     Prescription Medications as of 10/16/2023         Rx Number Disp Refills Start End Last Dispensed Date Next Fill Date Owning Pharmacy    acetaminophen (TYLENOL) 325 MG tablet    6/7/2022        Sig: Take 650 mg by mouth every 6 hours as needed for mild pain or fever    Class: Historical    Route: Oral    albuterol (PROVENTIL) (2.5 MG/3ML) 0.083% neb solution  60 mL 0 11/12/2021        Sig: Take 1 vial (2.5 mg) by nebulization every 4 hours as needed (cough)    Class: Local Print    Route: Nebulization    ibuprofen (ADVIL/MOTRIN) 600 MG tablet    6/7/2022        Sig: Take 600 mg by mouth every 6 hours as needed for moderate pain    Class: Historical    Route: Oral    ondansetron (ZOFRAN ODT) 4 MG ODT tab  10 tablet 0 6/7/2022    [id number] - [place], [place] - [address] AT [facility] & [facility]    Sig: Take 1 tablet (4 mg) by mouth every 6 hours as needed for nausea or vomiting    Class: E-Prescribe    Route: Oral    ORDER FOR DME, SET TO LOCAL PRINT,  1 Device 0 5/21/2015    [facility] [place] - [address], [address]    Sig: Equipment being ordered: knee brace    Class: Local Print    pyridostigmine (MESTINON) 60 MG tablet        [id number] - [place], [place] - [address] AT [facility] & [address]    Sig: Take 60 mg by mouth 3 times daily    Class: Historical    Route: Oral    pyridostigmine (MESTINON) 60 MG tablet  120 tablet 5 6/22/2022 7/22/2022   [id number] - [place], [place] - [address] AT [facility] & [address]    Sig: Take 1 tablet (60 mg) by mouth 4 times daily for 30 days    Class: E-Prescribe    Route: Oral                  Review  "of Systems:   A comprehensive review of systems was performed and found to be negative except as indicated above         Physical Exam:     Ht 1.651 m (5' 5\")   Wt 56.7 kg (125 lb)   BMI 20.80 kg/m     Physical Exam:   General: NAD  Neurologic:   Mental Status Exam: Alert, awake and easily engaged in interaction.   Cranial Nerves: PERRLA, EOMs intact, no nystagmus, facial movements symmetric.   Motor: Normal functional strength            Data:   CBC:  Lab Results   Component Value Date    WBC 7.8 03/31/2022    WBC 13.8 08/17/2018     Lab Results   Component Value Date    RBC 4.46 03/31/2022    RBC 3.92 08/17/2018     Lab Results   Component Value Date    HGB 13.5 03/31/2022    HGB 11.9 08/22/2018     Lab Results   Component Value Date    HCT 41.1 03/31/2022    HCT 36.3 08/17/2018     No components found for: \"MCT\"  Lab Results   Component Value Date    MCV 92 03/31/2022    MCV 93 08/17/2018     Lab Results   Component Value Date    MCH 30.3 03/31/2022    MCH 30.4 08/17/2018     Lab Results   Component Value Date    MCHC 32.8 03/31/2022    MCHC 32.8 08/17/2018     Lab Results   Component Value Date    RDW 12.0 03/31/2022    RDW 12.9 08/17/2018     Lab Results   Component Value Date     03/31/2022     08/22/2018       Last Basic Metabolic Panel:  Lab Results   Component Value Date     08/23/2018      Lab Results   Component Value Date    POTASSIUM 3.6 08/23/2018     Lab Results   Component Value Date    CHLORIDE 103 08/23/2018     Lab Results   Component Value Date    DES 9.0 08/23/2018     Lab Results   Component Value Date    CO2 30 08/23/2018     Lab Results   Component Value Date    BUN 5 08/23/2018     Lab Results   Component Value Date    CR 0.46 08/23/2018     Lab Results   Component Value Date    GLC 89 08/23/2018       Head CT:    MRI:           Assessment and Recommendations:     Tushar Mancia is a 23 year old female with acquired autoimmune generalized anti-AChR Ab positive " myasthenia gravis with minimal manifestation on essentially no treatment.   She has some intermittent leg swelling and discomfort which is of unclear association with myasthenia gravis but likely unrelated.     Recommendations:  - Continue Mestinon 60 mg TID as needed  -Internal medicine referral  -Return to clinic in 12 months.    I spent total of 30 minutes in face-to-face during today's visit. Over 50% of this time was spent counseling the patient and coordinating care. See note for details.               Again, thank you for allowing me to participate in the care of your patient.      Sincerely,    Obi Dave MD

## 2023-10-16 NOTE — PROGRESS NOTES
Virtual Visit Details    Type of service:  Video Visit   Video Start Time: 8:44 AM  Video End Time:8:44 AM    Originating Location (pt. Location): Home    Distant Location (provider location):  On-site  Platform used for Video Visit: [facility]                  Muscular Dystrophy Clinic Note     Tushar Mancia MRN# [id number]   YOB: 2000 Age: 23 year old      Date of Visit: Oct 16, 2023    Primary care provider: No Ref-Primary, Physician    Refering physician:         Chief Complaint:     Follow up       History is obtained from the patient, family and medical record       Interval Change:      Tushar Mancia is a 23 year old female was seen and examined at the muscular dystrophy clinic on Oct 16, 2023 for evaluation of myasthenia gravis.      Myasthenia Gravisd Activities of Daily Living (MG-ADL)    Function None=0 Mild=1 Moderate=2 Severe=3 Score   Talking Normal Intermittent slurring or nasal speech Constant slurring or nasal speech, but can be understood Difficult to understand 1   Chewing Normal Fatigue with solid food Fatigue with soft food Gastric tube 0   Swallowing Normal Rare episode of chocking Frequent chocking necessitating changes in diet Gastric tube 1   Breathing Normal Shortness of breath with exertion Shortness of breath at rest Ventilator dependence 0   Impairment of ability to brush teeth and comb hairs Normal Extra effort, but no rest periods needed Rest periods needed Cannot do one of these functions 0   Impairment of ability to arise from a chair Normal Mild, sometimes uses arms Moderate, always uses arms Severe, requires assistance 0   Double vision Normal Occurs but not daily Daily, but not constant Constant 1   Eyelid droop Normal Occurs but not daily Daily, but not constant Constant    She sometimes shakes when walks downstairs.  Swelling in legs towards the end of the day. It is associated with discomfort.     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     She is not taking Mestinon right now.               Allergies:    No Known Allergies          Medications:     Prescription Medications as of 10/16/2023         Rx Number Disp Refills Start End Last Dispensed Date Next Fill Date Owning Pharmacy    acetaminophen (TYLENOL) 325 MG tablet    6/7/2022        Sig: Take 650 mg by mouth every 6 hours as needed for mild pain or fever    Class: Historical    Route: Oral    albuterol (PROVENTIL) (2.5 MG/3ML) 0.083% neb solution  60 mL 0 11/12/2021        Sig: Take 1 vial (2.5 mg) by nebulization every 4 hours as needed (cough)    Class: Local Print    Route: Nebulization    ibuprofen (ADVIL/MOTRIN) 600 MG tablet    6/7/2022        Sig: Take 600 mg by mouth every 6 hours as needed for moderate pain    Class: Historical    Route: Oral    ondansetron (ZOFRAN ODT) 4 MG ODT tab  10 tablet 0 6/7/2022    [id number] - [place], [place] - [address] AT [facility]    Sig: Take 1 tablet (4 mg) by mouth every 6 hours as needed for nausea or vomiting    Class: E-Prescribe    Route: Oral    ORDER FOR DME, SET TO LOCAL "[employer]  1 Device 0 5/21/2015    [facility] [place], [place] - [address], [address]    Sig: Equipment being ordered: knee brace    Class: Local Print    pyridostigmine (MESTINON) 60 MG tablet        [id number] - [place], [place] - [address] AT [facility]    Sig: Take 60 mg by mouth 3 times daily    Class: Historical    Route: Oral    pyridostigmine (MESTINON) 60 MG tablet  120 tablet 5 6/22/2022 7/22/2022   [facility] #[id number] - [place], [place] - [address] AT [facility]    Sig: Take 1 tablet (60 mg) by mouth 4 times daily for 30 days    Class: E-Prescribe    Route: Oral                  Review of Systems:   A comprehensive review of systems was performed and found to be  "negative except as indicated above         Physical Exam:     Ht 1.651 m (5' 5\")   Wt 56.7 kg (125 lb)   BMI 20.80 kg/m     Physical Exam:   General: NAD  Neurologic:   Mental Status Exam: Alert, awake and easily engaged in interaction.   Cranial Nerves: PERRLA, EOMs intact, no nystagmus, facial movements symmetric.   Motor: Normal functional strength            Data:   CBC:  Lab Results   Component Value Date    WBC 7.8 03/31/2022    WBC 13.8 08/17/2018     Lab Results   Component Value Date    RBC 4.46 03/31/2022    RBC 3.92 08/17/2018     Lab Results   Component Value Date    HGB 13.5 03/31/2022    HGB 11.9 08/22/2018     Lab Results   Component Value Date    HCT 41.1 03/31/2022    HCT 36.3 08/17/2018     No components found for: \"MCT\"  Lab Results   Component Value Date    MCV 92 03/31/2022    MCV 93 08/17/2018     Lab Results   Component Value Date    MCH 30.3 03/31/2022    MCH 30.4 08/17/2018     Lab Results   Component Value Date    MCHC 32.8 03/31/2022    MCHC 32.8 08/17/2018     Lab Results   Component Value Date    RDW 12.0 03/31/2022    RDW 12.9 08/17/2018     Lab Results   Component Value Date     03/31/2022     08/22/2018       Last Basic Metabolic Panel:  Lab Results   Component Value Date     08/23/2018      Lab Results   Component Value Date    POTASSIUM 3.6 08/23/2018     Lab Results   Component Value Date    CHLORIDE 103 08/23/2018     Lab Results   Component Value Date    DES 9.0 08/23/2018     Lab Results   Component Value Date    CO2 30 08/23/2018     Lab Results   Component Value Date    BUN 5 08/23/2018     Lab Results   Component Value Date    CR 0.46 08/23/2018     Lab Results   Component Value Date    GLC 89 08/23/2018       Head CT:    MRI:           Assessment and Recommendations:     Tushar Mancia is a 23 year old female with acquired autoimmune generalized anti-AChR Ab positive myasthenia gravis with minimal manifestation on essentially no treatment.   She has " some intermittent leg swelling and discomfort which is of unclear association with myasthenia gravis but likely unrelated.     Recommendations:  - Continue Mestinon 60 mg TID as needed  -Internal medicine referral  -Return to clinic in 12 months.    I spent total of 30 minutes in face-to-face during today's visit. Over 50% of this time was spent counseling the patient and coordinating care. See note for details.    Obi Dave MD  [phone]       CC  Patient Care Team:  No Ref-Primary, Physician as PCP - Leda Bryant MD as MD (Pediatric Rheumatology)  Obi Dave MD as MD (Psychiatry & Neurology - Child & Adolescent Psychiatry)  Obi Dave MD as Assigned Neuroscience Provider      Copy to patient  WEI VARGAS TYAURORAANNALISE  [address]  [facility] [address]

## 2023-11-07 ENCOUNTER — TELEPHONE (OUTPATIENT)
Dept: INTERNAL MEDICINE | Facility: CLINIC | Age: 23
End: 2023-11-07
Payer: COMMERCIAL

## 2024-01-03 ENCOUNTER — OFFICE VISIT (OUTPATIENT)
Dept: INTERNAL MEDICINE | Facility: CLINIC | Age: 24
End: 2024-01-03
Payer: COMMERCIAL

## 2024-01-03 ENCOUNTER — LAB (OUTPATIENT)
Dept: LAB | Facility: CLINIC | Age: 24
End: 2024-01-03
Payer: COMMERCIAL

## 2024-01-03 VITALS
HEART RATE: 83 BPM | WEIGHT: 129.9 LBS | OXYGEN SATURATION: 99 % | DIASTOLIC BLOOD PRESSURE: 76 MMHG | SYSTOLIC BLOOD PRESSURE: 118 MMHG | BODY MASS INDEX: 21.62 KG/M2

## 2024-01-03 DIAGNOSIS — M25.50 MULTIPLE JOINT PAIN: ICD-10-CM

## 2024-01-03 DIAGNOSIS — R60.0 PERIPHERAL EDEMA: ICD-10-CM

## 2024-01-03 DIAGNOSIS — R60.0 PERIPHERAL EDEMA: Primary | ICD-10-CM

## 2024-01-03 LAB
ALBUMIN SERPL BCG-MCNC: 4.5 G/DL (ref 3.5–5.2)
ALP SERPL-CCNC: 61 U/L (ref 40–150)
ALT SERPL W P-5'-P-CCNC: 9 U/L (ref 0–50)
ANION GAP SERPL CALCULATED.3IONS-SCNC: 8 MMOL/L (ref 7–15)
AST SERPL W P-5'-P-CCNC: 17 U/L (ref 0–45)
BILIRUB SERPL-MCNC: 0.4 MG/DL
BUN SERPL-MCNC: 10.4 MG/DL (ref 6–20)
CALCIUM SERPL-MCNC: 9.4 MG/DL (ref 8.6–10)
CHLORIDE SERPL-SCNC: 104 MMOL/L (ref 98–107)
CREAT SERPL-MCNC: 0.52 MG/DL (ref 0.51–0.95)
CRP SERPL-MCNC: <3 MG/L
DEPRECATED HCO3 PLAS-SCNC: 26 MMOL/L (ref 22–29)
EGFRCR SERPLBLD CKD-EPI 2021: >90 ML/MIN/1.73M2
ERYTHROCYTE [DISTWIDTH] IN BLOOD BY AUTOMATED COUNT: 11.9 % (ref 10–15)
ERYTHROCYTE [SEDIMENTATION RATE] IN BLOOD BY WESTERGREN METHOD: 12 MM/HR (ref 0–20)
GLUCOSE SERPL-MCNC: 96 MG/DL (ref 70–99)
HCT VFR BLD AUTO: 39.3 % (ref 35–47)
HGB BLD-MCNC: 13.3 G/DL (ref 11.7–15.7)
MCH RBC QN AUTO: 30.6 PG (ref 26.5–33)
MCHC RBC AUTO-ENTMCNC: 33.8 G/DL (ref 31.5–36.5)
MCV RBC AUTO: 90 FL (ref 78–100)
PLATELET # BLD AUTO: 299 10E3/UL (ref 150–450)
POTASSIUM SERPL-SCNC: 4.4 MMOL/L (ref 3.4–5.3)
PROT SERPL-MCNC: 7.4 G/DL (ref 6.4–8.3)
RBC # BLD AUTO: 4.35 10E6/UL (ref 3.8–5.2)
SODIUM SERPL-SCNC: 138 MMOL/L (ref 135–145)
TSH SERPL DL<=0.005 MIU/L-ACNC: 1.97 UIU/ML (ref 0.3–4.2)
WBC # BLD AUTO: 7.7 10E3/UL (ref 4–11)

## 2024-01-03 PROCEDURE — 86140 C-REACTIVE PROTEIN: CPT | Performed by: PATHOLOGY

## 2024-01-03 PROCEDURE — 90471 IMMUNIZATION ADMIN: CPT | Performed by: INTERNAL MEDICINE

## 2024-01-03 PROCEDURE — 80053 COMPREHEN METABOLIC PANEL: CPT | Performed by: PATHOLOGY

## 2024-01-03 PROCEDURE — 85652 RBC SED RATE AUTOMATED: CPT | Performed by: PATHOLOGY

## 2024-01-03 PROCEDURE — 84443 ASSAY THYROID STIM HORMONE: CPT | Performed by: PATHOLOGY

## 2024-01-03 PROCEDURE — 99000 SPECIMEN HANDLING OFFICE-LAB: CPT | Performed by: PATHOLOGY

## 2024-01-03 PROCEDURE — 85027 COMPLETE CBC AUTOMATED: CPT | Performed by: PATHOLOGY

## 2024-01-03 PROCEDURE — 90677 PCV20 VACCINE IM: CPT | Performed by: INTERNAL MEDICINE

## 2024-01-03 PROCEDURE — 99204 OFFICE O/P NEW MOD 45 MIN: CPT | Mod: 25 | Performed by: INTERNAL MEDICINE

## 2024-01-03 PROCEDURE — 86038 ANTINUCLEAR ANTIBODIES: CPT | Performed by: INTERNAL MEDICINE

## 2024-01-03 PROCEDURE — 36415 COLL VENOUS BLD VENIPUNCTURE: CPT | Performed by: PATHOLOGY

## 2024-01-03 NOTE — PROGRESS NOTES
Assessment & Plan     Peripheral edema  and  Multiple joint pain    Tushar presents with about 9 months of foot swelling and about 5 months of diffuse joint pain most prominent in the fingers, knees, and ankles.  Exam does not elicit pain in those locations today, no swelling or redness noted.  We'll check labs as below.  Low likelihood of RA given no redness or focal joint swelling and she reports being low risk for tick bites, so did not order RA or Lyme labs.  Consider rheumatology referral pending lab results.     - Comprehensive metabolic panel (BMP + Alb, Alk Phos, ALT, AST, Total. Bili, TP); Future  - TSH with free T4 reflex; Future  - CBC with platelets; Future  - Anti Nuclear Rachael IgG by IFA with Reflex; Future  - ESR: Erythrocyte sedimentation rate; Future  - CRP, inflammation; Future    Pneumonia vaccine today.  She declined flu and COVID vaccines.  Advised getting tetanus booster at pharmacy.     Milton Gamino MD  [facility] INTERNAL MEDICINE [facility]   Tushar is a 23 year old, presenting for the following health issues:  Establish Care (Pt here to establish care and wants to discuss recent concerns with swollen feet)      HPI       Concern - swollen feet  Onset: ongoing for awhile, since last spring  Description: swelling is intermittent  Intensity: mild, moderate  Progression of Symptoms:  waxing and waning  Accompanying Signs & Symptoms: joint pain as below.  No dyspnea  Previous history of similar problem: no  Precipitating factors:        Worsened by: when she is fatigued  Alleviating factors:        Improved by: resting, elevating legs  Therapies tried and outcome: Tylenol- doesn't help    She has also been having ongoing joint pains since late summer- discuss this with neurology as well.  Mostly in the legs (mostly ankles, some in the knees) and fingers (2nd-4th fingers of both hands, not a specific joint).  No swelling in the fingers.  No known tick bites and  doesn't go in the woods often.      Review of Systems   Constitutional, MSK, CV systems are negative, except as otherwise noted.      Objective    /76 (BP Location: Right arm, Patient Position: Sitting, Cuff Size: Adult Regular)   Pulse 83   Wt 58.9 kg (129 lb 14.4 oz)   SpO2 99%   BMI 21.62 kg/m    Body mass index is 21.62 kg/m .  Physical Exam   GENERAL: healthy, alert and no distress  RESP: lungs clear to auscultation - no rales, rhonchi or wheezes  CV: regular rate and rhythm, normal S1 S2, no S3 or S4, no murmur, click or rub, no peripheral edema and peripheral pulses strong  MS: no pain to palpation of the fingers, wrists, knees, or ankles; no current swelling or redness

## 2024-01-03 NOTE — PROGRESS NOTES
Tushar is a 23 year old that presents in clinic today for the following:     Chief Complaint   Patient presents with    Establish Care     Pt here to establish care and wants to discuss recent concerns with swollen feet           1/3/2024     3:13 PM   Additional Questions   Roomed by MJL, EMT       Screenings from encounters over the past 10 days    No data recorded       Jai Rose at 3:15 PM on 1/3/2024

## 2024-01-04 LAB — ANA SER QL IF: NEGATIVE

## 2024-03-18 ENCOUNTER — TELEPHONE (OUTPATIENT)
Dept: NEUROLOGY | Facility: CLINIC | Age: 24
End: 2024-03-18
Payer: COMMERCIAL

## 2024-03-18 NOTE — TELEPHONE ENCOUNTER
Left Voicemail (1st Attempt) and Sent Mychart (1st Attempt) for the patient to call back and schedule the following:    Appointment type: Return Myasthenia Gravis  Provider: Tenzin  Return date: On or near 10/20/24  Specialty phone number: [phone]  Additional appointment(s) needed: N/A  Additonal Notes: 1yr follow-up, can be video or in person    Mali Pro on 3/18/2024 at 10:58 AM

## 2024-03-20 ENCOUNTER — LAB (OUTPATIENT)
Dept: LAB | Facility: CLINIC | Age: 24
End: 2024-03-20
Payer: COMMERCIAL

## 2024-03-20 ENCOUNTER — OFFICE VISIT (OUTPATIENT)
Dept: RHEUMATOLOGY | Facility: CLINIC | Age: 24
End: 2024-03-20
Attending: INTERNAL MEDICINE
Payer: COMMERCIAL

## 2024-03-20 VITALS
DIASTOLIC BLOOD PRESSURE: 70 MMHG | HEART RATE: 76 BPM | WEIGHT: 130.3 LBS | BODY MASS INDEX: 21.68 KG/M2 | SYSTOLIC BLOOD PRESSURE: 116 MMHG

## 2024-03-20 DIAGNOSIS — M25.50 MULTIPLE JOINT PAIN: ICD-10-CM

## 2024-03-20 DIAGNOSIS — G70.00 MYASTHENIA GRAVIS (H): ICD-10-CM

## 2024-03-20 DIAGNOSIS — M25.50 MULTIPLE JOINT PAIN: Primary | ICD-10-CM

## 2024-03-20 DIAGNOSIS — Z90.89 S/P THYMECTOMY: ICD-10-CM

## 2024-03-20 LAB — HCV AB SERPL QL IA: NONREACTIVE

## 2024-03-20 PROCEDURE — 36415 COLL VENOUS BLD VENIPUNCTURE: CPT

## 2024-03-20 PROCEDURE — 86803 HEPATITIS C AB TEST: CPT

## 2024-03-20 PROCEDURE — 99204 OFFICE O/P NEW MOD 45 MIN: CPT | Performed by: INTERNAL MEDICINE

## 2024-03-20 PROCEDURE — 86200 CCP ANTIBODY: CPT

## 2024-03-20 NOTE — PROGRESS NOTES
"  This document was created using a software with less than 100% fidelity, at times resulting in unintended, even erroneous syntax and grammar.  The reader is advised to keep this under consideration while reviewing, interpreting this note.      Rheumatology Consult Note      Tushar Mancia     YOB: 2000 Age: 23 year old    Date of visit: 3/20/24    PCP: Janis Goldberg    Chief Complaint   Patient presents with:  Consult      Assessment and Plan     Tushar was seen today for consult.    Diagnoses and all orders for this visit:    Multiple joint pain  -     Adult Rheumatology  Referral  -     Hepatitis C antibody; Future  -     Cyclic Citrullinated Peptide Antibody IgG; Future    Myasthenia gravis (H)    S/P thymectomy           This patient presents with polyarthralgias which are intermittent going on for the past nearly a year.  She has myasthenia gravis which was diagnosed in 2018 when her her anti-CCP antibodies were negative these will be checked again today.  More recently rheumatoid factor and MAXIMILIAN were negative.  Reassuringly today's examination is entirely normal as per as evidence of inflammatory joint disease, enthesopathy or dactylitis is concerned.  She did as noted below note that today was a \"good day\".  At this time further workup as noted and observations.  Will meet in the next 2 to 3 months.         HPI   Ayyan arthralgias, accompanied by her mom.  She reports that her symptoms have troubled her over the past 12 months.  She experiences pain in multiple joint areas, tarah Mancia is a 23 year old female  is seen today for evaluation of she is experienced pain in her fingers, wrists, ankles knees.  It all began when she started hurting in her ankles probably more so on the right side than the left the only time she has swelling on her ankles is towards the latter part of the day.  Ankles and other joint areas noted earlier do not trouble her first thing in the " morning.  The symptoms are intermittent.  Today she noted was a good day.  On average she thinks that her joint pains troubled her perhaps 2 times in 1 week.  When she does have pain she has tried a variety of measures including resting Epsom salts and ibuprofen with variable response.  She has not observed swelling of her digits or other joint areas with the exception of ankles as noted.  She does not have history of psoriasis herself or the family or that of ulcerative colitis or Crohn's.  She was diagnosed with myasthenia gravis in 2018 she has undergone thymectomy, she is on Mestinon as needed.  She has had extensive workup done for evaluation of these symptoms including rheumatoid factor MAXIMILIAN more than once, acute phase response, which were all within normal range.   Her sister, younger, is noted to have initially mixed connective tissue disease and other thought is that it might be infected rheumatoid arthritis.  She follows up with rheumatology here in [facility].  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  Occasional alcohol..           Active Problem List     Patient Active Problem List   Diagnosis    Seasonal allergic rhinitis    Myasthenia gravis (H)    S/P thymectomy    Brachial plexopathy secondary to stretching injury    Myasthenia gravis with exacerbation (H)    Acne    Depression    Dry skin    GERD (gastroesophageal reflux disease)    Moderate asthma with acute exacerbation, unspecified whether persistent    Nipple discharge in female    Ptosis of right eyelid    Underweight    Vitamin D deficiency     Past Medical History     Past Medical History:   Diagnosis Date    Allergic state     Myasthenia gravis (H)     Uncomplicated asthma      Past Surgical History     Past Surgical History:   Procedure Laterality Date    BRONCHOSCOPY (RIGID OR FLEXIBLE), DIAGNOSTIC N/A 8/15/2018    Procedure: COMBINED BRONCHOSCOPY (RIGID OR FLEXIBLE), LAVAGE;  Flexible Bronchoscopy with Lavage (BAL) , Left Thoracoscopic  Thymectomy ;  Surgeon: Vanessa Danielson MD;  Location: [facility] OR    NO HISTORY OF SURGERY      THORACOSCOPIC THYMECTOMY Left 8/15/2018    Procedure: THORACOSCOPIC THYMECTOMY;;  Surgeon: Arias Estevez MD;  Location: [facility] OR     Allergy   No Known Allergies  Current Medication List     Current Outpatient Medications   Medication Sig    acetaminophen (TYLENOL) 325 MG tablet Take 650 mg by mouth every 6 hours as needed for mild pain or fever    albuterol (PROVENTIL) (2.5 MG/3ML) 0.083% neb solution Take 1 vial (2.5 mg) by nebulization every 4 hours as needed (cough)    ibuprofen (ADVIL/MOTRIN) 600 MG tablet Take 600 mg by mouth every 6 hours as needed for moderate pain    ondansetron (ZOFRAN ODT) 4 MG ODT tab Take 1 tablet (4 mg) by mouth every 6 hours as needed for nausea or vomiting (Patient not taking: Reported on 6/22/2022)    ORDER FOR DME, SET TO LOCAL PRINT, Equipment being ordered: knee brace (Patient not taking: Reported on 7/11/2022)    pyRIDostigmine (MESTINON) 60 MG tablet Take 1 tablet (60 mg) by mouth 3 times daily for 120 days     No current facility-administered medications for this visit.            Family History     Family History   Problem Relation Age of Onset    Other - See Comments Sister          Physical Exam     COMPREHENSIVE EXAMINATION:  Vitals:    03/20/24 1000   BP: 116/70   Pulse: 76   Weight: 59.1 kg (130 lb 4.8 oz)     A well appearing alert oriented female. Vital data as noted above. Her eyes examined for inflammation/scleromalacia. ENT examined for oral mucositis, moisture, thrush, nasal deformity, external ear redness, deformity. Her neck is examined for suppleness and lymphadenopathy.  Cardiopulmonary examination without dyspnea at rest, use of accessory muscles of breathing, wheezing, edema, peripheral or central cyanosis.  Abdomen examined for softness, tenderness, obvious organomegaly.  Skin examined for heliotrope, malar area eruption, lupus pernio, periungual erythema,  sclerodactyly, papules, erythema nodosum, purpura, nail pitting, onycholysis, and obvious psoriasis lesion. Neurological examination done for alertness, speech, facial symmetry,  tone and power in upper and lower extremities, and gait. The joint examination is performed for swelling, tenderness, warmth, erythema, and range of motion in the following joints: DIPs, PIPs, MCPs, wrists, first CMC's, elbows, shoulders, hips, knees, ankles, feet; spine for range of motion and paraspinal muscles for tenderness. The salient normal / abnormal findings are appended.  She does not have synovitis in any other palpable joints of upper and lower extremities.  She does not have any limitation in the range of motion in any of the joints.  She does not have dactylitis of digits or toes.  She does not have enthesitis such as a Achilles.  There is no rash on her face stomatitis sclerodactyly periungual erythema.  She has artificial nails.  She does not have tenderness across trapezius proximal and distal upper or lower extremities.  Her gait is normal.  This patient presents with polyarthralgias, negative rheumatoid factor negative MAXIMILIAN and a sed rate and CRP normal, history of myasthenia gravis diagnosed in 2018, younger sister diagnosed with rheumatoid arthritis.    Labs / Imaging (select studies)     Rheumatoid Factor   Date Value Ref Range Status   05/10/2018 <20 <20 IU/mL Final     Complement C3   Date Value Ref Range Status   05/10/2018 78 76 - 169 mg/dL Final     Complement C4   Date Value Ref Range Status   05/10/2018 22 15 - 50 mg/dL Final      Hemoglobin   Date Value Ref Range Status   01/03/2024 13.3 11.7 - 15.7 g/dL Final   03/31/2022 13.5 11.7 - 15.7 g/dL Final   08/22/2018 11.9 11.7 - 15.7 g/dL Final   08/17/2018 12.4 11.7 - 15.7 g/dL Final   08/17/2018 11.9 11.7 - 15.7 g/dL Final     Urea Nitrogen   Date Value Ref Range Status   01/03/2024 10.4 6.0 - 20.0 mg/dL Final   08/23/2018 5 (L) 7 - 19 mg/dL Final   08/22/2018 5  (L) 7 - 19 mg/dL Final   08/21/2018 4 (L) 7 - 19 mg/dL Final     Erythrocyte Sedimentation Rate   Date Value Ref Range Status   01/03/2024 12 0 - 20 mm/hr Final     CRP Inflammation   Date Value Ref Range Status   07/25/2018 24.3 (H) 0.0 - 8.0 mg/L Final     AST   Date Value Ref Range Status   01/03/2024 17 0 - 45 U/L Final     Comment:     Reference intervals for this test were updated on 6/12/2023 to more accurately reflect our healthy population. There may be differences in the flagging of prior results with similar values performed with this method. Interpretation of those prior results can be made in the context of the updated reference intervals.   07/23/2018 18 0 - 35 U/L Final     Albumin   Date Value Ref Range Status   01/03/2024 4.5 3.5 - 5.2 g/dL Final   08/23/2018 3.0 (L) 3.4 - 5.0 g/dL Final   08/22/2018 3.1 (L) 3.4 - 5.0 g/dL Final   08/21/2018 2.1 (L) 3.4 - 5.0 g/dL Final     Alkaline Phosphatase   Date Value Ref Range Status   01/03/2024 61 40 - 150 U/L Final     Comment:     Reference intervals for this test were updated on 11/14/2023 to more accurately reflect our healthy population. There may be differences in the flagging of prior results with similar values performed with this method. Interpretation of those prior results can be made in the context of the updated reference intervals.   07/23/2018 84 40 - 150 U/L Final     ALT   Date Value Ref Range Status   01/03/2024 9 0 - 50 U/L Final     Comment:     Reference intervals for this test were updated on 6/12/2023 to more accurately reflect our healthy population. There may be differences in the flagging of prior results with similar values performed with this method. Interpretation of those prior results can be made in the context of the updated reference intervals.     07/23/2018 20 0 - 50 U/L Final     Rheumatoid Factor   Date Value Ref Range Status   05/10/2018 <20 <20 IU/mL Final          Immunization History     Immunization History    Administered Date(s) Administered    Comvax (HIB/HepB) 2000, 2000, 09/03/2002    DTAP (<7y) 2000, 2001, 03/05/2002, 11/27/2002, 08/30/2006    HEPA 05/06/2003, 05/31/2005    HPV 06/07/2013, 11/26/2013, 04/03/2014    Influenza (H1N1) 12/04/2009, 12/28/2009    Influenza Vaccine, 6+MO IM (QUADRIVALENT W/PRESERVATIVES) 11/27/2002, 11/07/2005, 12/04/2009, 09/22/2011, 11/26/2013    MMR 11/27/2002, 08/30/2006    Meningococcal (Menomune ) 06/07/2013    Meningococcal ACWY (Menactra ) 10/25/2017    Pneumococcal (PCV 7) 2000, 2001, 03/05/2002    Pneumococcal 20 valent Conjugate (Prevnar 20) 01/03/2024    Poliovirus, inactivated (IPV) 2001, 2001, 03/05/2002, 08/30/2006    TDAP (Adacel,Boostrix) 06/07/2013    Typhoid IM 05/06/2003    Varicella 09/03/2002, 08/30/2006    Yellow Fever 05/06/2003

## 2024-03-21 LAB — CCP AB SER IA-ACNC: 2.9 U/ML

## 2024-04-25 ENCOUNTER — HOSPITAL ENCOUNTER (EMERGENCY)
Facility: CLINIC | Age: 24
Discharge: HOME | End: 2024-04-25
Attending: FAMILY MEDICINE | Admitting: FAMILY MEDICINE
Payer: COMMERCIAL

## 2024-04-25 VITALS
DIASTOLIC BLOOD PRESSURE: 77 MMHG | TEMPERATURE: 98 F | RESPIRATION RATE: 18 BRPM | OXYGEN SATURATION: 95 % | SYSTOLIC BLOOD PRESSURE: 124 MMHG | HEART RATE: 87 BPM

## 2024-04-25 DIAGNOSIS — Z87.898 HX OF FATIGUE: ICD-10-CM

## 2024-04-25 DIAGNOSIS — G70.00 MYASTHENIA GRAVIS (H): ICD-10-CM

## 2024-04-25 DIAGNOSIS — R76.8 POSITIVE LYME DISEASE SEROLOGY: ICD-10-CM

## 2024-04-25 LAB
ALBUMIN SERPL BCG-MCNC: 4.6 G/DL (ref 3.5–5.2)
ALBUMIN UR-MCNC: 20 MG/DL
ALP SERPL-CCNC: 74 U/L (ref 40–150)
ALT SERPL W P-5'-P-CCNC: 6 U/L (ref 0–50)
ANION GAP SERPL CALCULATED.3IONS-SCNC: 13 MMOL/L (ref 7–15)
APPEARANCE UR: CLEAR
APTT PPP: 34 SECONDS (ref 22–38)
AST SERPL W P-5'-P-CCNC: 17 U/L (ref 0–45)
ATRIAL RATE - MUSE: 82 BPM
BASOPHILS # BLD AUTO: 0 10E3/UL (ref 0–0.2)
BASOPHILS NFR BLD AUTO: 0 %
BILIRUB SERPL-MCNC: 0.4 MG/DL
BILIRUB UR QL STRIP: NEGATIVE
BUN SERPL-MCNC: 10.1 MG/DL (ref 6–20)
CALCIUM SERPL-MCNC: 9.7 MG/DL (ref 8.6–10)
CHLORIDE SERPL-SCNC: 103 MMOL/L (ref 98–107)
CK SERPL-CCNC: 56 U/L (ref 26–192)
COLOR UR AUTO: YELLOW
CREAT SERPL-MCNC: 0.55 MG/DL (ref 0.51–0.95)
CRP SERPL-MCNC: <3 MG/L
DEPRECATED HCO3 PLAS-SCNC: 23 MMOL/L (ref 22–29)
DIASTOLIC BLOOD PRESSURE - MUSE: NORMAL MMHG
EGFRCR SERPLBLD CKD-EPI 2021: >90 ML/MIN/1.73M2
EOSINOPHIL # BLD AUTO: 0.1 10E3/UL (ref 0–0.7)
EOSINOPHIL NFR BLD AUTO: 2 %
ERYTHROCYTE [DISTWIDTH] IN BLOOD BY AUTOMATED COUNT: 12.3 % (ref 10–15)
GLUCOSE SERPL-MCNC: 87 MG/DL (ref 70–99)
GLUCOSE UR STRIP-MCNC: NEGATIVE MG/DL
HCG SERPL QL: NEGATIVE
HCT VFR BLD AUTO: 44.4 % (ref 35–47)
HGB BLD-MCNC: 14.6 G/DL (ref 11.7–15.7)
HGB UR QL STRIP: NEGATIVE
HOLD SPECIMEN: NORMAL
IMM GRANULOCYTES # BLD: 0 10E3/UL
IMM GRANULOCYTES NFR BLD: 0 %
INR PPP: 0.93 (ref 0.85–1.15)
INTERPRETATION ECG - MUSE: NORMAL
KETONES UR STRIP-MCNC: NEGATIVE MG/DL
LEUKOCYTE ESTERASE UR QL STRIP: NEGATIVE
LYMPHOCYTES # BLD AUTO: 2.3 10E3/UL (ref 0.8–5.3)
LYMPHOCYTES NFR BLD AUTO: 31 %
MAGNESIUM SERPL-MCNC: 2.1 MG/DL (ref 1.7–2.3)
MCH RBC QN AUTO: 30.5 PG (ref 26.5–33)
MCHC RBC AUTO-ENTMCNC: 32.9 G/DL (ref 31.5–36.5)
MCV RBC AUTO: 93 FL (ref 78–100)
MONOCYTES # BLD AUTO: 0.5 10E3/UL (ref 0–1.3)
MONOCYTES NFR BLD AUTO: 7 %
MUCOUS THREADS #/AREA URNS LPF: PRESENT /LPF
NEUTROPHILS # BLD AUTO: 4.5 10E3/UL (ref 1.6–8.3)
NEUTROPHILS NFR BLD AUTO: 60 %
NITRATE UR QL: NEGATIVE
NRBC # BLD AUTO: 0 10E3/UL
NRBC BLD AUTO-RTO: 0 /100
NT-PROBNP SERPL-MCNC: <36 PG/ML (ref 0–450)
P AXIS - MUSE: 83 DEGREES
PH UR STRIP: 7.5 [PH] (ref 5–7)
PHOSPHATE SERPL-MCNC: 2.7 MG/DL (ref 2.5–4.5)
PLATELET # BLD AUTO: 329 10E3/UL (ref 150–450)
POTASSIUM SERPL-SCNC: 4 MMOL/L (ref 3.4–5.3)
PR INTERVAL - MUSE: 128 MS
PROT SERPL-MCNC: 7.7 G/DL (ref 6.4–8.3)
QRS DURATION - MUSE: 70 MS
QT - MUSE: 374 MS
QTC - MUSE: 436 MS
R AXIS - MUSE: 81 DEGREES
RBC # BLD AUTO: 4.78 10E6/UL (ref 3.8–5.2)
RBC URINE: 3 /HPF
SODIUM SERPL-SCNC: 139 MMOL/L (ref 135–145)
SP GR UR STRIP: 1.03 (ref 1–1.03)
SQUAMOUS EPITHELIAL: 1 /HPF
SYSTOLIC BLOOD PRESSURE - MUSE: NORMAL MMHG
T AXIS - MUSE: 66 DEGREES
TROPONIN T SERPL HS-MCNC: 6 NG/L
TSH SERPL DL<=0.005 MIU/L-ACNC: 1.25 UIU/ML (ref 0.3–4.2)
UROBILINOGEN UR STRIP-MCNC: NORMAL MG/DL
VENTRICULAR RATE- MUSE: 82 BPM
WBC # BLD AUTO: 7.6 10E3/UL (ref 4–11)
WBC URINE: 1 /HPF

## 2024-04-25 PROCEDURE — 84443 ASSAY THYROID STIM HORMONE: CPT | Performed by: FAMILY MEDICINE

## 2024-04-25 PROCEDURE — 258N000003 HC RX IP 258 OP 636: Performed by: FAMILY MEDICINE

## 2024-04-25 PROCEDURE — 86140 C-REACTIVE PROTEIN: CPT | Performed by: FAMILY MEDICINE

## 2024-04-25 PROCEDURE — 84703 CHORIONIC GONADOTROPIN ASSAY: CPT | Performed by: FAMILY MEDICINE

## 2024-04-25 PROCEDURE — 84484 ASSAY OF TROPONIN QUANT: CPT | Performed by: FAMILY MEDICINE

## 2024-04-25 PROCEDURE — 85049 AUTOMATED PLATELET COUNT: CPT | Performed by: FAMILY MEDICINE

## 2024-04-25 PROCEDURE — 82550 ASSAY OF CK (CPK): CPT | Performed by: FAMILY MEDICINE

## 2024-04-25 PROCEDURE — 94150 VITAL CAPACITY TEST: CPT

## 2024-04-25 PROCEDURE — 99284 EMERGENCY DEPT VISIT MOD MDM: CPT | Performed by: FAMILY MEDICINE

## 2024-04-25 PROCEDURE — 93005 ELECTROCARDIOGRAM TRACING: CPT | Mod: RTG

## 2024-04-25 PROCEDURE — 85730 THROMBOPLASTIN TIME PARTIAL: CPT | Performed by: FAMILY MEDICINE

## 2024-04-25 PROCEDURE — 96360 HYDRATION IV INFUSION INIT: CPT | Performed by: FAMILY MEDICINE

## 2024-04-25 PROCEDURE — 96361 HYDRATE IV INFUSION ADD-ON: CPT | Performed by: FAMILY MEDICINE

## 2024-04-25 PROCEDURE — 99222 1ST HOSP IP/OBS MODERATE 55: CPT | Performed by: PSYCHIATRY & NEUROLOGY

## 2024-04-25 PROCEDURE — 36415 COLL VENOUS BLD VENIPUNCTURE: CPT | Performed by: FAMILY MEDICINE

## 2024-04-25 PROCEDURE — 81001 URINALYSIS AUTO W/SCOPE: CPT | Performed by: FAMILY MEDICINE

## 2024-04-25 PROCEDURE — 99284 EMERGENCY DEPT VISIT MOD MDM: CPT | Mod: 25 | Performed by: FAMILY MEDICINE

## 2024-04-25 PROCEDURE — 83735 ASSAY OF MAGNESIUM: CPT | Performed by: FAMILY MEDICINE

## 2024-04-25 PROCEDURE — 80053 COMPREHEN METABOLIC PANEL: CPT | Performed by: FAMILY MEDICINE

## 2024-04-25 PROCEDURE — 999N000157 HC STATISTIC RCP TIME EA 10 MIN

## 2024-04-25 PROCEDURE — 83880 ASSAY OF NATRIURETIC PEPTIDE: CPT | Performed by: FAMILY MEDICINE

## 2024-04-25 PROCEDURE — 84100 ASSAY OF PHOSPHORUS: CPT | Performed by: FAMILY MEDICINE

## 2024-04-25 PROCEDURE — 85610 PROTHROMBIN TIME: CPT | Performed by: FAMILY MEDICINE

## 2024-04-25 RX ORDER — LIDOCAINE 40 MG/G
CREAM TOPICAL
Status: DISCONTINUED | OUTPATIENT
Start: 2024-04-25 | End: 2024-04-25 | Stop reason: HOSPADM

## 2024-04-25 RX ADMIN — SODIUM CHLORIDE 1000 ML: 9 INJECTION, SOLUTION INTRAVENOUS at 14:02

## 2024-04-25 ASSESSMENT — ACTIVITIES OF DAILY LIVING (ADL)
ADLS_ACUITY_SCORE: 35

## 2024-04-25 ASSESSMENT — COLUMBIA-SUICIDE SEVERITY RATING SCALE - C-SSRS
1. IN THE PAST MONTH, HAVE YOU WISHED YOU WERE DEAD OR WISHED YOU COULD GO TO SLEEP AND NOT WAKE UP?: NO
2. HAVE YOU ACTUALLY HAD ANY THOUGHTS OF KILLING YOURSELF IN THE PAST MONTH?: NO
6. HAVE YOU EVER DONE ANYTHING, STARTED TO DO ANYTHING, OR PREPARED TO DO ANYTHING TO END YOUR LIFE?: NO

## 2024-04-25 NOTE — ED PROVIDER NOTES
ED Provider Note  [facility]      History     Chief Complaint   Patient presents with    Fatigue    Referral     HPI  Tushar Mancia is a 23 year old female with history of myasthenia gravis who presents emergency room with 2 weeks of increasing fatigue.  Myasthenia gravis diagnosed back in 2018 had a thymectomy at that point.  Patient does take Mestinon periodically but has not taken it at all.  Patient notes just general fatigue when waking up and also by 2-3 pm in the afternoon she is extremely fatigued and tired.  She does have some swelling of both feet also.  Patient had no recent illnesses otherwise.  She denies double vision, no problems swallowing, no ptosis of the lids, no other focal complaints or one-sided weakness, other paresthesias etc. No history of chest pain or otherwise cardiac disease.  Otherwise feels fine currently, no fevers or chills.    Past Medical History  Past Medical History:   Diagnosis Date    Allergic state     Myasthenia gravis (H)     Uncomplicated asthma      Past Surgical History:   Procedure Laterality Date    BRONCHOSCOPY (RIGID OR FLEXIBLE), DIAGNOSTIC N/A 8/15/2018    Procedure: COMBINED BRONCHOSCOPY (RIGID OR FLEXIBLE), LAVAGE;  Flexible Bronchoscopy with Lavage (BAL) , Left Thoracoscopic Thymectomy ;  Surgeon: Vanessa Danielson MD;  Location: [facility] OR    NO HISTORY OF SURGERY      THORACOSCOPIC THYMECTOMY Left 8/15/2018    Procedure: THORACOSCOPIC THYMECTOMY;;  Surgeon: Arias Estevez MD;  Location: [facility] OR     acetaminophen (TYLENOL) 325 MG tablet  albuterol (PROVENTIL) (2.5 MG/3ML) 0.083% neb solution  ibuprofen (ADVIL/MOTRIN) 600 MG tablet  pyRIDostigmine (MESTINON) 60 MG tablet      No Known Allergies  Family History  Family History   Problem Relation Age of Onset    Other - See Comments Sister      Social History   Social History     Tobacco Use    Smoking status: Never    Smokeless tobacco: Never   Substance Use Topics     Alcohol use: No    Drug use: No         A medically appropriate review of systems was performed with pertinent positives and negatives noted in the HPI, and all other systems negative.    Physical Exam   BP: 124/77  Pulse: 87  Temp: 98  F (36.7  C)  Resp: 18  SpO2: 95 %  Physical Exam  Vitals and nursing note reviewed.   Constitutional:       General: She is not in acute distress.     Appearance: Normal appearance. She is well-developed. She is not ill-appearing.   HENT:      Head: Normocephalic and atraumatic.      Nose: Nose normal.      Mouth/Throat:      Mouth: Mucous membranes are moist.      Pharynx: Oropharynx is clear.   Eyes:      General: No scleral icterus.     Extraocular Movements: Extraocular movements intact.      Conjunctiva/sclera: Conjunctivae normal.      Pupils: Pupils are equal, round, and reactive to light.      Comments: No ptosis no double vision or other acute findings   Cardiovascular:      Rate and Rhythm: Normal rate and regular rhythm.   Pulmonary:      Effort: Pulmonary effort is normal. No respiratory distress.      Breath sounds: Stridor present.   Abdominal:      General: Abdomen is flat. There is no distension.      Tenderness: There is no abdominal tenderness.   Musculoskeletal:         General: Swelling present. No tenderness.      Cervical back: Normal range of motion and neck supple.   Skin:     General: Skin is warm and dry.      Capillary Refill: Capillary refill takes less than 2 seconds.      Coloration: Skin is not jaundiced or pale.      Findings: No rash.   Neurological:      General: No focal deficit present.      Mental Status: She is alert and oriented to person, place, and time. Mental status is at baseline.   Psychiatric:      Comments: Appropriate here in the ER           ED Course, Procedures, & Data      Reviewed in epic.  Patient seen in urgent care patient history of recent rash under skin that is improving.  Patient also history of myasthenia gravis followed by  neurology also.    The ER consult neurology talk to them they will see the patient NIF also.  Labs reordered IV fluids EKG etc.    Labs reviewed today CK was 56 magnesium 2.1.  BNP was negative troponin negative.  Urinalysis negative for any concerns for infection TSH 1.25.  Pregnancy test negative.  Sodium 139 potassium 4.0.  Bicarb is 23 gap is 13 creatinine 0.55.  Glucose 87 LFTs within normal limits.  White count 7.6 hemoglobin 14.6.  Patient received liter normal saline also.    Patient seen by neurology reviewed labs etc. at this point they recommended the NIF which was done by respiratory therapy it was a -40.  Discussed with neurology agree at this point feel patient appropriate to go home.    Patient did have a positive Lyme test done yesterday will follow-up with MD regarding this patient is aware also other labs stable is comfortable being discharged.    Procedures            EKG Interpretation:      Interpreted by Dale Landers MD  Time reviewed: 1356  Symptoms at time of EKG: fatigue   Rhythm: normal sinus   Rate: normal  Axis: normal  Ectopy: none  Conduction: normal  ST Segments/ T Waves: No ST-T wave changes  Q Waves: none  Comparison to prior: No old EKG available    Clinical Impression: normal EKG          Results for orders placed or performed during the hospital encounter of 04/25/24   CRP inflammation     Status: Normal   Result Value Ref Range    CRP Inflammation <3.00 <5.00 mg/L   Partial thromboplastin time     Status: Normal   Result Value Ref Range    aPTT 34 22 - 38 Seconds   INR     Status: Normal   Result Value Ref Range    INR 0.93 0.85 - 1.15   Comprehensive metabolic panel     Status: Normal   Result Value Ref Range    Sodium 139 135 - 145 mmol/L    Potassium 4.0 3.4 - 5.3 mmol/L    Carbon Dioxide (CO2) 23 22 - 29 mmol/L    Anion Gap 13 7 - 15 mmol/L    Urea Nitrogen 10.1 6.0 - 20.0 mg/dL    Creatinine 0.55 0.51 - 0.95 mg/dL    GFR Estimate >90 >60 mL/min/1.73m2    Calcium 9.7  8.6 - 10.0 mg/dL    Chloride 103 98 - 107 mmol/L    Glucose 87 70 - 99 mg/dL    Alkaline Phosphatase 74 40 - 150 U/L    AST 17 0 - 45 U/L    ALT 6 0 - 50 U/L    Protein Total 7.7 6.4 - 8.3 g/dL    Albumin 4.6 3.5 - 5.2 g/dL    Bilirubin Total 0.4 <=1.2 mg/dL   Magnesium     Status: Normal   Result Value Ref Range    Magnesium 2.1 1.7 - 2.3 mg/dL   Phosphorus     Status: Normal   Result Value Ref Range    Phosphorus 2.7 2.5 - 4.5 mg/dL   Troponin T, High Sensitivity     Status: Normal   Result Value Ref Range    Troponin T, High Sensitivity 6 <=14 ng/L   TSH     Status: Normal   Result Value Ref Range    TSH 1.25 0.30 - 4.20 uIU/mL   Nt probnp inpatient (BNP)     Status: Normal   Result Value Ref Range    N terminal Pro BNP Inpatient <36 0 - 450 pg/mL   HCG qualitative Blood     Status: Normal   Result Value Ref Range    hCG Serum Qualitative Negative Negative   UA with Microscopic reflex to Culture     Status: Abnormal    Specimen: Urine, Clean Catch   Result Value Ref Range    Color Urine Yellow Colorless, Straw, Light Yellow, Yellow    Appearance Urine Clear Clear    Glucose Urine Negative Negative mg/dL    Bilirubin Urine Negative Negative    Ketones Urine Negative Negative mg/dL    Specific Gravity Urine 1.028 1.003 - 1.035    Blood Urine Negative Negative    pH Urine 7.5 (H) 5.0 - 7.0    Protein Albumin Urine 20 (A) Negative mg/dL    Urobilinogen Urine Normal Normal, 2.0 mg/dL    Nitrite Urine Negative Negative    Leukocyte Esterase Urine Negative Negative    Mucus Urine Present (A) None Seen /LPF    RBC Urine 3 (H) <=2 /HPF    WBC Urine 1 <=5 /HPF    Squamous Epithelials Urine 1 <=1 /HPF    Narrative    Urine Culture not indicated   CK total     Status: Normal   Result Value Ref Range    CK 56 26 - 192 U/L   CBC with platelets and differential     Status: None   Result Value Ref Range    WBC Count 7.6 4.0 - 11.0 10e3/uL    RBC Count 4.78 3.80 - 5.20 10e6/uL    Hemoglobin 14.6 11.7 - 15.7 g/dL    Hematocrit  44.4 35.0 - 47.0 %    MCV 93 78 - 100 fL    MCH 30.5 26.5 - 33.0 pg    MCHC 32.9 31.5 - 36.5 g/dL    RDW 12.3 10.0 - 15.0 %    Platelet Count 329 150 - 450 10e3/uL    % Neutrophils 60 %    % Lymphocytes 31 %    % Monocytes 7 %    % Eosinophils 2 %    % Basophils 0 %    % Immature Granulocytes 0 %    NRBCs per 100 WBC 0 <1 /100    Absolute Neutrophils 4.5 1.6 - 8.3 10e3/uL    Absolute Lymphocytes 2.3 0.8 - 5.3 10e3/uL    Absolute Monocytes 0.5 0.0 - 1.3 10e3/uL    Absolute Eosinophils 0.1 0.0 - 0.7 10e3/uL    Absolute Basophils 0.0 0.0 - 0.2 10e3/uL    Absolute Immature Granulocytes 0.0 <=0.4 10e3/uL    Absolute NRBCs 0.0 10e3/uL   Extra Tube     Status: None    Narrative    The following orders were created for panel order Extra Tube.  Procedure                               Abnormality         Status                     ---------                               -----------         ------                     Extra Green Top (Lithium...[[id number]]                      Final result                 Please view results for these tests on the individual orders.   Extra Green Top (Lithium Heparin) Tube     Status: None   Result Value Ref Range    Hold Specimen [facility]    EKG 12-lead, tracing only     Status: None   Result Value Ref Range    Systolic Blood Pressure  mmHg    Diastolic Blood Pressure  mmHg    Ventricular Rate 82 BPM    Atrial Rate 82 BPM    IA Interval 128 ms    QRS Duration 70 ms     ms    QTc 436 ms    P Axis 83 degrees    R AXIS 81 degrees    T Axis 66 degrees    Interpretation ECG       Sinus rhythm  Normal ECG  Unconfirmed report - interpretation of this ECG is computer generated - see medical record for final interpretation  Confirmed by - EMERGENCY ROOM, PHYSICIAN ([id number]),  GILSON KLEIN ([id number]) on 4/25/2024 8:08:31 PM     CBC with platelets differential     Status: None    Narrative    The following orders were created for panel order CBC with platelets differential.  Procedure                                Abnormality         Status                     ---------                               -----------         ------                     CBC with platelets and d...[[id number]]                      Final result                 Please view results for these tests on the individual orders.     Medications   sodium chloride 0.9% BOLUS 1,000 mL (0 mLs Intravenous Stopped 4/25/24 1817)     Labs Ordered and Resulted from Time of ED Arrival to Time of ED Departure   ROUTINE UA WITH MICROSCOPIC REFLEX TO CULTURE - Abnormal       Result Value    Color Urine Yellow      Appearance Urine Clear      Glucose Urine Negative      Bilirubin Urine Negative      Ketones Urine Negative      Specific Gravity Urine 1.028      Blood Urine Negative      pH Urine 7.5 (*)     Protein Albumin Urine 20 (*)     Urobilinogen Urine Normal      Nitrite Urine Negative      Leukocyte Esterase Urine Negative      Mucus Urine Present (*)     RBC Urine 3 (*)     WBC Urine 1      Squamous Epithelials Urine 1     CRP INFLAMMATION - Normal    CRP Inflammation <3.00     PARTIAL THROMBOPLASTIN TIME - Normal    aPTT 34     INR - Normal    INR 0.93     COMPREHENSIVE METABOLIC PANEL - Normal    Sodium 139      Potassium 4.0      Carbon Dioxide (CO2) 23      Anion Gap 13      Urea Nitrogen 10.1      Creatinine 0.55      GFR Estimate >90      Calcium 9.7      Chloride 103      Glucose 87      Alkaline Phosphatase 74      AST 17      ALT 6      Protein Total 7.7      Albumin 4.6      Bilirubin Total 0.4     MAGNESIUM - Normal    Magnesium 2.1     PHOSPHORUS - Normal    Phosphorus 2.7     TROPONIN T, HIGH SENSITIVITY - Normal    Troponin T, High Sensitivity 6     TSH - Normal    TSH 1.25     NT PROBNP INPATIENT - Normal    N terminal Pro BNP Inpatient <36     HCG QUALITATIVE PREGNANCY - Normal    hCG Serum Qualitative Negative     CK TOTAL - Normal    CK 56     CBC WITH PLATELETS AND DIFFERENTIAL    WBC Count 7.6      RBC Count 4.78      Hemoglobin 14.6       Hematocrit 44.4      MCV 93      MCH 30.5      MCHC 32.9      RDW 12.3      Platelet Count 329      % Neutrophils 60      % Lymphocytes 31      % Monocytes 7      % Eosinophils 2      % Basophils 0      % Immature Granulocytes 0      NRBCs per 100 WBC 0      Absolute Neutrophils 4.5      Absolute Lymphocytes 2.3      Absolute Monocytes 0.5      Absolute Eosinophils 0.1      Absolute Basophils 0.0      Absolute Immature Granulocytes 0.0      Absolute NRBCs 0.0       No orders to display          Critical care was not performed.     Medical Decision Making  The patient's presentation was of moderate complexity (an acute illness with systemic symptoms).    The patient's evaluation involved:  review of external note(s) from 3+ sources (see separate area of note for details)  review of 3+ test result(s) ordered prior to this encounter (see separate area of note for details)  ordering and/or review of 3+ test(s) in this encounter (see separate area of note for details)  discussion of management or test interpretation with another health professional (see separate area of note for details)    The patient's management necessitated moderate risk (prescription drug management including medications given in the ED).    Assessment & Plan   23-year-old female who is not pregnant presents with ongoing lethargy.  Patient notes 2 weeks of this has history of myasthenia gravis diagnosed 2018 had a thymectomy also has not taken medication for this last few weeks feels different than her normal myasthenia without any other symptoms had a rash that now resolved in the chest area patient seen yesterday a Lyme titer was positive initially they are going to do some other testing regarding this presented to the ER for evaluation for neurology was evaluated this point NIF was -40 which they felt appropriate and stable felt the symptoms were not myasthenic labs otherwise cardiac review etc. IV fluids given patient stable is comfortable  it may be viral syndrome also following up with the Lyme titer patient will do self patient then discharged return if any worsening symptoms follow-up neurology also.       I have reviewed the nursing notes. I have reviewed the findings, diagnosis, plan and need for follow up with the patient.    Discharge Medication List as of 4/25/2024  6:18 PM          Final diagnoses:   Hx of fatigue   Myasthenia gravis (H)   Positive Lyme disease serology       Dale Landers MD  [facility] EMERGENCY DEPARTMENT  4/25/2024    This note was created at least in part by the use of dragon voice dictation system. Inadvertent typographical errors may still exist.  Dale Landers MD.  Patient evaluated in the emergency department during the COVID-19 pandemic period. Careful attention to patients safety was addressed throughout the evaluation. Evaluation and treatment management was initiated with disposition made efficiently and appropriate as possible to minimize any risk of potential exposure to patient during this evaluation.       Dale Landers MD  04/25/24 2094

## 2024-04-25 NOTE — PROVIDER NOTIFICATION
Per MD order, RT performed the followin/25/24 1720   Negative Inspiratory Force (NIF)   Negative Inspiratory Force (cm H2O) -40   Effort (NIF) good   Patient Position (NIF) sitting in chair   Negative Inspiratory Force (DOCT ONLY) Yes        24 1720   Incentive Spirometer (IS)   Administration (IS) instruction provided, initial;mouthpiece utilized   Number of Repetitions (IS) 5   Level Incentive Spirometer (mL) 1500   Patient Tolerance (IS) no adverse signs/symptoms present     RT Tye on 2024 at 5:30 PM

## 2024-04-25 NOTE — DISCHARGE INSTRUCTIONS
Home.  Labs and spirometry testing looks good.  You were seen by neurology who felt OK home possible virus, but also Lyme titer positive yesterday and to follow up on this.    Results for orders placed or performed during the hospital encounter of 04/25/24   CRP inflammation     Status: Normal   Result Value Ref Range    CRP Inflammation <3.00 <5.00 mg/L   Partial thromboplastin time     Status: Normal   Result Value Ref Range    aPTT 34 22 - 38 Seconds   INR     Status: Normal   Result Value Ref Range    INR 0.93 0.85 - 1.15   Comprehensive metabolic panel     Status: Normal   Result Value Ref Range    Sodium 139 135 - 145 mmol/L    Potassium 4.0 3.4 - 5.3 mmol/L    Carbon Dioxide (CO2) 23 22 - 29 mmol/L    Anion Gap 13 7 - 15 mmol/L    Urea Nitrogen 10.1 6.0 - 20.0 mg/dL    Creatinine 0.55 0.51 - 0.95 mg/dL    GFR Estimate >90 >60 mL/min/1.73m2    Calcium 9.7 8.6 - 10.0 mg/dL    Chloride 103 98 - 107 mmol/L    Glucose 87 70 - 99 mg/dL    Alkaline Phosphatase 74 40 - 150 U/L    AST 17 0 - 45 U/L    ALT 6 0 - 50 U/L    Protein Total 7.7 6.4 - 8.3 g/dL    Albumin 4.6 3.5 - 5.2 g/dL    Bilirubin Total 0.4 <=1.2 mg/dL   Magnesium     Status: Normal   Result Value Ref Range    Magnesium 2.1 1.7 - 2.3 mg/dL   Phosphorus     Status: Normal   Result Value Ref Range    Phosphorus 2.7 2.5 - 4.5 mg/dL   Troponin T, High Sensitivity     Status: Normal   Result Value Ref Range    Troponin T, High Sensitivity 6 <=14 ng/L   TSH     Status: Normal   Result Value Ref Range    TSH 1.25 0.30 - 4.20 uIU/mL   Nt probnp inpatient (BNP)     Status: Normal   Result Value Ref Range    N terminal Pro BNP Inpatient <36 0 - 450 pg/mL   HCG qualitative Blood     Status: Normal   Result Value Ref Range    hCG Serum Qualitative Negative Negative   UA with Microscopic reflex to Culture     Status: Abnormal    Specimen: Urine, Clean Catch   Result Value Ref Range    Color Urine Yellow Colorless, Straw, Light Yellow, Yellow    Appearance Urine  Clear Clear    Glucose Urine Negative Negative mg/dL    Bilirubin Urine Negative Negative    Ketones Urine Negative Negative mg/dL    Specific Gravity Urine 1.028 1.003 - 1.035    Blood Urine Negative Negative    pH Urine 7.5 (H) 5.0 - 7.0    Protein Albumin Urine 20 (A) Negative mg/dL    Urobilinogen Urine Normal Normal, 2.0 mg/dL    Nitrite Urine Negative Negative    Leukocyte Esterase Urine Negative Negative    Mucus Urine Present (A) None Seen /LPF    RBC Urine 3 (H) <=2 /HPF    WBC Urine 1 <=5 /HPF    Squamous Epithelials Urine 1 <=1 /HPF    Narrative    Urine Culture not indicated   CK total     Status: Normal   Result Value Ref Range    CK 56 26 - 192 U/L   CBC with platelets and differential     Status: None   Result Value Ref Range    WBC Count 7.6 4.0 - 11.0 10e3/uL    RBC Count 4.78 3.80 - 5.20 10e6/uL    Hemoglobin 14.6 11.7 - 15.7 g/dL    Hematocrit 44.4 35.0 - 47.0 %    MCV 93 78 - 100 fL    MCH 30.5 26.5 - 33.0 pg    MCHC 32.9 31.5 - 36.5 g/dL    RDW 12.3 10.0 - 15.0 %    Platelet Count 329 150 - 450 10e3/uL    % Neutrophils 60 %    % Lymphocytes 31 %    % Monocytes 7 %    % Eosinophils 2 %    % Basophils 0 %    % Immature Granulocytes 0 %    NRBCs per 100 WBC 0 <1 /100    Absolute Neutrophils 4.5 1.6 - 8.3 10e3/uL    Absolute Lymphocytes 2.3 0.8 - 5.3 10e3/uL    Absolute Monocytes 0.5 0.0 - 1.3 10e3/uL    Absolute Eosinophils 0.1 0.0 - 0.7 10e3/uL    Absolute Basophils 0.0 0.0 - 0.2 10e3/uL    Absolute Immature Granulocytes 0.0 <=0.4 10e3/uL    Absolute NRBCs 0.0 10e3/uL   Extra Tube     Status: None    Narrative    The following orders were created for panel order Extra Tube.  Procedure                               Abnormality         Status                     ---------                               -----------         ------                     Extra Green Top (Lithium...[[id number]]                      Final result                 Please view results for these tests on the individual orders.    Extra Green Top (Lithium Heparin) Tube     Status: None   Result Value Ref Range    Hold Specimen [facility]    EKG 12-lead, tracing only     Status: None (Preliminary result)   Result Value Ref Range    Systolic Blood Pressure  mmHg    Diastolic Blood Pressure  mmHg    Ventricular Rate 82 BPM    Atrial Rate 82 BPM    ME Interval 128 ms    QRS Duration 70 ms     ms    QTc 436 ms    P Axis 83 degrees    R AXIS 81 degrees    T Axis 66 degrees    Interpretation ECG Sinus rhythm  Normal ECG      CBC with platelets differential     Status: None    Narrative    The following orders were created for panel order CBC with platelets differential.  Procedure                               Abnormality         Status                     ---------                               -----------         ------                     CBC with platelets and d...[[id number]]                      Final result                 Please view results for these tests on the individual orders.

## 2024-04-25 NOTE — CONSULTS
"[facility]  Neurology Consultation    Patient Name:  Tushar Mancia  MRN:  [id number]    :  2000  Date of Service:  2024  Primary care provider:  Janis Goldberg      The neurology consultation service was asked to see Tushar Mancia by ED provider to evaluate for myasthenic crisis.     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     #. Myasthenia gravis (anti-AChR Rachael positive, class IIA, s/p tyhmus removal in 2018)  Patient follows with Dr. Dave outpatient, last evaluation outpatient in May 202. Last hospitalization on 2022 due to myasthenic crisis (bulbar symptoms such as diplopia, dysphagia, slurred speech along with generalized weakness and declined of respiratory function). Patient responded well to IVIG at that time for a total of 4 doses with improvement of baseline neurologic symptoms. Patient is coming today for a few weeks of progressive exhaustion \"feeling tired most of the time\" despite sleeping more hours than usual . Patient is currently on college, but does not identify new stress. Denies fever, night sweats, chills, urinary symptoms, sore throat, joint pain, muscle pain or weakness, shortness of breath, chest pain, lightheadedness, dizziness, vertigo, syncope, falls, balance issues. Hemodynamically stable on admission. Her labs showed Hb 14.6, CRP<3, INR 0.93, K 4, Cr 0.55, Mg 2.1, P 2.7, TSH 1.25, NTproBNP<36, BHCg negative, CK 56, UA negative for infection. Physical exam as below with myasthenia gravis worksheet with score 12 (similar to last year ~11).     - Based on patient's hx (lack of bulbar symptoms) and physical exam with myasthenia gravis composite scale similar to last year, patient is less likely to have an myasthenic crisis at this time.   - Consider to " "rule out Lyme disease given preliminary results showed positive (confirmation pending)  - Pending NIF to evaluate respiratory strength, if normal, Neurology will sign off.     Thank you for allowing the neurology service to participate in the care of Tushar Mancia.  Please contact us with questions.      This patient was discussed with the neurology attending faculty, Dr. Figueroa.    Shireen Herndon   Internal Medicine PGY-3  [facility]     History   Patient follows with Dr. Dave outpatient, last evaluation outpatient in May 202. Last hospitalization on 06/07/2022 due to myasthenic crisis (bulbar symptoms such as diplopia, dysphagia, slurred speech along with generalized weakness and declined of respiratory function). Patient responded well to IVIG at that time for a total of 4 doses with improvement of baseline neurologic symptoms. Patient is coming today for a few weeks of progressive exhaustion \"feeling tired most of the time\" despite sleeping more hours than usual.    Patient states her body feels tired for a number of weeks. She states that she just can not sleep enough. Denies fever, night sweats, chills, urinary symptoms, sore throat, joint pain, muscle pain or weakness, shortness of breath, chest pain, lightheadedness, dizziness, vertigo, syncope, falls, balance issues, swallowing. Patient was also had intermittent rashes, and some joint swelling over the last few months. She is being evaluated by Rheumatology. Patient has not been taking Mestinol regularly given she feels has not been very helpful in regards to her symptoms.     Patient went to [place] and she was testes for Lyme disease given the intermittent rashes and joint pain. Preliminary tests are positive, with confirmation pending.     Last Neurology Office Visit on 5/2/2022:    Myasthenia Gravis Worksheet    Test item None Mild Moderate Severe Score   Grade 0 1 2 3     Diplopia (lateral gaze), R or L, s 60 " 11-59 1-10 spontaneous 1   Ptosis (upward gaze), s 60 11-59 1-10 spontaneous 1   Facial weakness   (Eyelid closure) Normal Some resistence No resistance Incomplete 1   Dysarthria with counting 1-50 >50 30-49 10-29 9 0   Swallowing 4 oz water Normal mild  Severe (regurgitation) Not able     Right arm held outstretched at 90 deg, s 240  10-89 0-9 2   Left arm held outstretched at 90 deg, s 240  10-89 0-9 1    Vital capacity, % predicted 80 65-79 50-64 <50     Right hand , kgW, M/F 45/30 15-44/10-29 5-14/5-9 0-4/0-4 ND   Left hand , kgW, man/woman 35/25 15-34/10-24 5-14/5-9 0-4/0-4 ND   Head lift 45 deg supine, s 120  1-29 0 1(33)   Right leg held outstretched at 45 deg supine, s 100 31-99 1-30 0 2 (15)   Left leg held outstretched at 45 deg supine, s 100 31-99 1-30 0 2 (10)      Score 11 (previously 11)    ROS  A 10-point ROS performed and negative unless documented in HPI.    PMH  Past Medical History:   Diagnosis Date    Allergic state     Myasthenia gravis (H)     Uncomplicated asthma      Past Surgical History:   Procedure Laterality Date    BRONCHOSCOPY (RIGID OR FLEXIBLE), DIAGNOSTIC N/A 8/15/2018    Procedure: COMBINED BRONCHOSCOPY (RIGID OR FLEXIBLE), LAVAGE;  Flexible Bronchoscopy with Lavage (BAL) , Left Thoracoscopic Thymectomy ;  Surgeon: Vanessa Danielson MD;  Location: [facility] OR    NO HISTORY OF SURGERY      THORACOSCOPIC THYMECTOMY Left 8/15/2018    Procedure: THORACOSCOPIC THYMECTOMY;;  Surgeon: Arias Estevez MD;  Location: [facility] OR       Home Medications   (Not in a hospital admission)      Scheduled Meds  Current Facility-Administered Medications   Medication Dose Route Frequency Provider Last Rate Last Admin    sodium chloride (PF) 0.9% PF flush 3 mL  3 mL Intracatheter Q8H Dale Landers MD        sodium chloride 0.9% BOLUS 1,000 mL  1,000 mL Intravenous Once Dale Landers MD           Infusion Meds  Current Facility-Administered Medications   Medication  Dose Route Frequency Provider Last Rate Last Admin       PRN Meds  Current Facility-Administered Medications   Medication Dose Route Frequency Provider Last Rate Last Admin    lidocaine (LMX4) cream   Topical Q1H PRN Dale Landers MD        lidocaine 1 % 0.1-1 mL  0.1-1 mL Other Q1H PRN Dale Landers MD        sodium chloride (PF) 0.9% PF flush 3 mL  3 mL Intracatheter q1 min prn Dale Landers MD              Allergies  No Known Allergies    Social History  Social History     Tobacco Use    Smoking status: Never    Smokeless tobacco: Never   Substance Use Topics    Alcohol use: No       Family History    Family History   Problem Relation Age of Onset    Other - See Comments Sister         Physical Exam   Temp:  [98  F (36.7  C)] 98  F (36.7  C)  Pulse:  [87] 87  Resp:  [18] 18  BP: (124)/(77) 124/77  SpO2:  [95 %] 95 %      Neurologic  Mental Status:  alert, oriented x 3, follows commands, speech clear and fluent, naming and repetition normal  Cranial Nerves:  visual fields intact, PERRL, EOMI with normal smooth pursuit, facial sensation intact and symmetric, facial movements symmetric, hearing not formally tested but intact to conversation, palate elevation symmetric and uvula midline, no dysarthria, shoulder shrug strong bilaterally, tongue protrusion midline  Motor:  normal muscle tone and bulk, no abnormal movements, able to move all limbs spontaneously, strength 5/5 throughout upper and lower extremities, no pronator drift  Reflexes:  2+ and symmetric throughout, no clonus  Sensory:  light touch sensation intact and symmetric throughout upper and lower extremities, no extinction on double simultaneous stimulation   Coordination:  normal finger-to-nose and heel-to-shin bilaterally without dysmetria  Station/Gait:  normal width, turn, arm swing    Myasthenia Gravis Composite Scale:  Ptosis with upward gaze:  1  Diplopia on lateral gaze:  1  Eye  "closure:   0  Talkin  [place]  Swallowin  Breathin  Neck flexion/extension: 0  Shoulder abduction:  0  Hip flexion:   0  TOTAL    8        Myasthenia Gravis Worksheet: New Score = 10  Test item None Mild Moderate Severe Prior Score New Score   Grade 0 1 2 3      Diplopia (lateral gaze), R or L, s 60 11-59 1-10 spontaneous 1 1   Ptosis (upward gaze), s 60 11-59 1-10 spontaneous 1 1   Facial weakness   (Eyelid closure) Normal Some resistence No resistance Incomplete 1 0   Dysarthria with counting 1-50 >50 30-49 10-29 9 0 0   Swallowing 4 oz water Normal Mild  Severe (regurgitation) Not able   0   Right arm held outstretched at 90 deg, s 240  10-89 0-9 2 2   Left arm held outstretched at 90 deg, s 240  10-89 0-9 1  2   Vital capacity, % predicted 80 65-79 50-64 <50   N/A   Right hand , kgW, M/F 45/30 15-44/10-29 5-14/5-9 0-4/0-4 ND N/A   Left hand , kgW, man/woman 35/25 15-34/10-24 5-14/5-9 0-4/0-4 ND N/A   Head lift 45 deg supine, s 120  1-29 0 1(33) 2 (12s)   Right leg held outstretched at 45 deg supine, s 100 31-99 1-30 0 2 (15) 2 (7s)   Left leg held outstretched at 45 deg supine, s 100 31-99 1-30 0 2 (10) 2 (9s)      Investigations     Labs  BMP  No results for input(s): \"NA\", \"POTASSIUM\", \"CHLORIDE\", \"CO2\", \"BUN\", \"CR\", \"DES\" in the last 168 hours.    Liver Panel  No results for input(s): \"PROTTOTAL\", \"ALBUMIN\", \"BILITOTAL\", \"ALKPHOS\", \"AST\", \"ALT\", \"BILIDIRECT\" in the last 168 hours.    Invalid input(s): \"AMMONIA\"    CBC  No results for input(s): \"WBC\", \"HGB\", \"PLT\" in the last 168 hours.    COAGS  No results for input(s): \"INR\", \"PTT\", \"AXA\", \"FIBR\" in the last 168 hours.    ABG  No results for input(s): \"PH\", \"PCO2\", \"PO2\", \"HCO3\" in the last 168 hours.    CRP/ESR  No results for input(s): \"CRP\" in the last 168 hours.    Invalid input(s): \"ESR\"    CSF  No results for input(s): \"CGLU\", \"CTP\" in the last 168 hours.    Invalid input(s): \"CCSF\"    MICRO  No results " "for input(s): \"CULT\" in the last 168 hours.    LIPIDS  No results for input(s): \"CHOL\", \"HDL\", \"LDL\", \"TRIG\", \"CHOLHDLRATIO\" in the last 97621 hours.    A1C    No lab results found.    Imaging  I have personally reviewed most recent and pertinent labs, tests, and radiological images; relevant findings per HPI.   "

## 2024-04-25 NOTE — ED TRIAGE NOTES
Patient arrived to by walk in. Patient c/o worsening fatigue and intermittent feet swelling x 2 weeks.    Denies SOB, CP.     Triage Assessment (Adult)       Row Name 04/25/24 4940          Triage Assessment    Airway WDL WDL        Respiratory WDL    Respiratory WDL WDL        Skin Circulation/Temperature WDL    Skin Circulation/Temperature WDL WDL        Cardiac WDL    Cardiac WDL WDL        Peripheral/Neurovascular WDL    Peripheral Neurovascular WDL WDL        Cognitive/Neuro/Behavioral WDL    Cognitive/Neuro/Behavioral WDL WDL

## 2024-08-25 ENCOUNTER — HEALTH MAINTENANCE LETTER (OUTPATIENT)
Age: 24
End: 2024-08-25

## 2025-02-11 ENCOUNTER — TRANSFERRED RECORDS (OUTPATIENT)
Dept: MULTI SPECIALTY CLINIC | Facility: CLINIC | Age: 25
End: 2025-02-11

## 2025-02-11 LAB — CHLAMYDIA - HIM PATIENT REPORTED: NORMAL

## 2025-05-13 NOTE — PROGRESS NOTES
"  Assessment & Plan     Chronic fatigue  Diffuse pain  Pt has had persistent fatigue and diffuse   - CK total; Future  - Comprehensive metabolic panel (BMP + Alb, Alk Phos, ALT, AST, Total. Bili, TP); Future  - Anti Nuclear Rachael IgG by IFA with Reflex; Future  - ESR: Erythrocyte sedimentation rate; Future  - CRP, inflammation; Future    STAPLETON (dyspnea on exertion)  ***  - XR Chest 2 Views; Future              {Follow-up (Optional):320865}          Subjective   Tushar is a 24 year old, presenting for the following health issues:  Consult (Increase of fatigue)    History of Present Illness       Reason for visit:  I have been extremely exhausted and fatigued    She eats 2-3 servings of fruits and vegetables daily.She consumes 4 sweetened beverage(s) daily.She exercises with enough effort to increase her heart rate 20 to 29 minutes per day.  She exercises with enough effort to increase her heart rate 4 days per week.   She is taking medications regularly.       I saw Tushar last Jan 2024 to establish care and for edema and multiple joint pain.  CMP, CBC, TSH, ESR, CRP, and MAXIMILIAN were checked and normal.  Rheumatology referral was placed and CCP was rechecked with Hep C which were normal.  She went to the  and ER April 2024 with fatigue and had borderline Lyme disease serology but no positive antibodies on confirmatory test. She was seen at  on 3/20 for low energy and had normal Vit D, CBC, and TSH.       Pt is returning today with complaints of ongoing fatigue, generalized body aches for over 1 year. Pt reports the problem isn't necessarily getting better or worse. Fatigue is persistent throughout the day, will have episodes of \"overwhelming\" fatigue, most commonly in the afternoon/evening, accompanied by generalized body aches worst in the lower extremities, and ankle swelling worse on the right. She has previously described joint pain, but she feels this is more generalized, not necessarily joint-specific pain. " "She is sleeping well, will sometimes nap in the afternoon for an hour. She has tried to increase her activity through walks and stretching, which have helped her body aches. She has been getting SOB with exertion over this time as well, sometimes she will be SOB even with talking. She has thought about the fatigue/SOB with exertion relating to her history of myasthenia gravis; however, she states this does not feel like usual exacerbations or experiences with myasthenia gravis in the past. She is also endorsing brain fog, decreased appetite, and nausea without vomiting over this time. Denies fever, chills, weight loss, headache, cough, CP, palpitations/racing heart, bowel/bladder changes, recent rashes, or joint redness/swelling. 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 She drinks socially, 1-2 drinks per week. Denies tobacco use, occasional marijuana use. 1 sexual partner in the past year, not concerned for STI and reports previous testing, is on birth control. Family history: not certain but believes sister has mixed connective tissue disorder, potentially RA; no other reported family history.      Wt Readings from Last 4 Encounters:   05/14/25 59.3 kg (130 lb 11.2 oz)   03/20/24 59.1 kg (130 lb 4.8 oz)   01/03/24 58.9 kg (129 lb 14.4 oz)   10/16/23 56.7 kg (125 lb)     ***  {Superlists:152196}     {MA/LPN/RN Pre-Provider Visit Orders- hCG/UA/Strep (Optional):352369}  {SUPERLIST (Optional):711304}  {additonal problems for provider to add (Optional):549841}      Constitutional, {Systems choices:290472} systems are negative, except as otherwise noted.       Objective    /73 (BP Location: Right arm, Patient Position: Sitting, Cuff Size: Adult Regular)   Temp 98.5  F (36.9  C)   Resp 18   Ht 1.651 m (5' 5\")   Wt 59.3 kg (130 lb 11.2 oz)   SpO2 99%   BMI 21.75 kg/m    Body mass index is 21.75 " kg/m .  Physical Exam  Constitutional:       General: She is not in acute distress.     Appearance: She is not ill-appearing or toxic-appearing.   HENT:      Right Ear: Tympanic membrane normal.      Left Ear: Tympanic membrane normal.      Mouth/Throat:      Mouth: Mucous membranes are moist.      Pharynx: Oropharynx is clear. No oropharyngeal exudate or posterior oropharyngeal erythema.   Eyes:      Conjunctiva/sclera: Conjunctivae normal.   Cardiovascular:      Rate and Rhythm: Normal rate and regular rhythm.      Heart sounds: Normal heart sounds. No murmur heard.  Pulmonary:      Effort: Pulmonary effort is normal.      Breath sounds: Normal breath sounds. No wheezing, rhonchi or rales.   Abdominal:      General: Bowel sounds are normal.      Palpations: Abdomen is soft.      Tenderness: There is no abdominal tenderness.   Musculoskeletal:         General: No swelling.   Skin:     General: Skin is warm.   Neurological:      General: No focal deficit present.      Mental Status: She is alert.        {Exam List (Optional):628002}          Medical Student involved:     Signed Electronically by: Milton Gamino MD

## 2025-05-14 ENCOUNTER — ANCILLARY PROCEDURE (OUTPATIENT)
Dept: GENERAL RADIOLOGY | Facility: CLINIC | Age: 25
End: 2025-05-14
Attending: INTERNAL MEDICINE
Payer: COMMERCIAL

## 2025-05-14 ENCOUNTER — OFFICE VISIT (OUTPATIENT)
Dept: INTERNAL MEDICINE | Facility: CLINIC | Age: 25
End: 2025-05-14
Payer: COMMERCIAL

## 2025-05-14 ENCOUNTER — LAB (OUTPATIENT)
Dept: LAB | Facility: CLINIC | Age: 25
End: 2025-05-14
Payer: COMMERCIAL

## 2025-05-14 ENCOUNTER — RESULTS FOLLOW-UP (OUTPATIENT)
Dept: INTERNAL MEDICINE | Facility: CLINIC | Age: 25
End: 2025-05-14

## 2025-05-14 VITALS
WEIGHT: 130.7 LBS | BODY MASS INDEX: 21.77 KG/M2 | HEIGHT: 65 IN | DIASTOLIC BLOOD PRESSURE: 73 MMHG | TEMPERATURE: 98.5 F | OXYGEN SATURATION: 99 % | RESPIRATION RATE: 18 BRPM | SYSTOLIC BLOOD PRESSURE: 112 MMHG

## 2025-05-14 DIAGNOSIS — R52 DIFFUSE PAIN: Primary | ICD-10-CM

## 2025-05-14 DIAGNOSIS — R06.09 DOE (DYSPNEA ON EXERTION): ICD-10-CM

## 2025-05-14 DIAGNOSIS — M79.7 FIBROMYALGIA: Primary | ICD-10-CM

## 2025-05-14 DIAGNOSIS — R52 DIFFUSE PAIN: ICD-10-CM

## 2025-05-14 DIAGNOSIS — R53.82 CHRONIC FATIGUE: ICD-10-CM

## 2025-05-14 LAB
ALBUMIN SERPL BCG-MCNC: 4.9 G/DL (ref 3.5–5.2)
ALP SERPL-CCNC: 71 U/L (ref 40–150)
ALT SERPL W P-5'-P-CCNC: 14 U/L (ref 0–50)
ANION GAP SERPL CALCULATED.3IONS-SCNC: 12 MMOL/L (ref 7–15)
AST SERPL W P-5'-P-CCNC: 18 U/L (ref 0–45)
BILIRUB SERPL-MCNC: 0.3 MG/DL
BUN SERPL-MCNC: 10.8 MG/DL (ref 6–20)
CALCIUM SERPL-MCNC: 9.7 MG/DL (ref 8.8–10.4)
CHLORIDE SERPL-SCNC: 102 MMOL/L (ref 98–107)
CK SERPL-CCNC: 54 U/L (ref 26–192)
CREAT SERPL-MCNC: 0.65 MG/DL (ref 0.51–0.95)
CRP SERPL-MCNC: <3 MG/L
EGFRCR SERPLBLD CKD-EPI 2021: >90 ML/MIN/1.73M2
ERYTHROCYTE [SEDIMENTATION RATE] IN BLOOD BY WESTERGREN METHOD: 11 MM/HR (ref 0–20)
GLUCOSE SERPL-MCNC: 78 MG/DL (ref 70–99)
HCO3 SERPL-SCNC: 24 MMOL/L (ref 22–29)
POTASSIUM SERPL-SCNC: 4 MMOL/L (ref 3.4–5.3)
PROT SERPL-MCNC: 8 G/DL (ref 6.4–8.3)
SODIUM SERPL-SCNC: 138 MMOL/L (ref 135–145)

## 2025-05-14 PROCEDURE — 99000 SPECIMEN HANDLING OFFICE-LAB: CPT | Performed by: PATHOLOGY

## 2025-05-14 PROCEDURE — 71046 X-RAY EXAM CHEST 2 VIEWS: CPT | Mod: GC | Performed by: RADIOLOGY

## 2025-05-14 PROCEDURE — 82550 ASSAY OF CK (CPK): CPT | Performed by: PATHOLOGY

## 2025-05-14 PROCEDURE — 3078F DIAST BP <80 MM HG: CPT | Performed by: INTERNAL MEDICINE

## 2025-05-14 PROCEDURE — 99213 OFFICE O/P EST LOW 20 MIN: CPT | Performed by: INTERNAL MEDICINE

## 2025-05-14 PROCEDURE — 36415 COLL VENOUS BLD VENIPUNCTURE: CPT | Performed by: PATHOLOGY

## 2025-05-14 PROCEDURE — 80053 COMPREHEN METABOLIC PANEL: CPT | Performed by: PATHOLOGY

## 2025-05-14 PROCEDURE — 86038 ANTINUCLEAR ANTIBODIES: CPT | Performed by: INTERNAL MEDICINE

## 2025-05-14 PROCEDURE — 3074F SYST BP LT 130 MM HG: CPT | Performed by: INTERNAL MEDICINE

## 2025-05-14 PROCEDURE — 86140 C-REACTIVE PROTEIN: CPT | Performed by: PATHOLOGY

## 2025-05-14 PROCEDURE — 85652 RBC SED RATE AUTOMATED: CPT | Performed by: PATHOLOGY

## 2025-05-14 RX ORDER — SPIRONOLACTONE 25 MG/1
25 TABLET ORAL DAILY
COMMUNITY

## 2025-05-14 RX ORDER — SPIRONOLACTONE 50 MG/1
1 TABLET, FILM COATED ORAL
COMMUNITY
Start: 2025-03-21

## 2025-05-14 ASSESSMENT — ASTHMA QUESTIONNAIRES
QUESTION_1 LAST FOUR WEEKS HOW MUCH OF THE TIME DID YOUR ASTHMA KEEP YOU FROM GETTING AS MUCH DONE AT WORK, SCHOOL OR AT HOME: NONE OF THE TIME
QUESTION_3 LAST FOUR WEEKS HOW OFTEN DID YOUR ASTHMA SYMPTOMS (WHEEZING, COUGHING, SHORTNESS OF BREATH, CHEST TIGHTNESS OR PAIN) WAKE YOU UP AT NIGHT OR EARLIER THAN USUAL IN THE MORNING: NOT AT ALL
QUESTION_2 LAST FOUR WEEKS HOW OFTEN HAVE YOU HAD SHORTNESS OF BREATH: THREE TO SIX TIMES A WEEK
QUESTION_5 LAST FOUR WEEKS HOW WOULD YOU RATE YOUR ASTHMA CONTROL: WELL CONTROLLED
ACT_TOTALSCORE: 22
QUESTION_4 LAST FOUR WEEKS HOW OFTEN HAVE YOU USED YOUR RESCUE INHALER OR NEBULIZER MEDICATION (SUCH AS ALBUTEROL): NOT AT ALL

## 2025-05-14 NOTE — PROGRESS NOTES
Assessment & Plan     Chronic fatigue  Diffuse pain  Pt has had persistent fatigue and diffuse pain for over a year, workup over multiple visits last year, including Rheumatology, CMP, CBC, TSH, ESR, CRP, CCP, CK, Hep C, Lyme all normal. Vit D, CBC, TSH ~2mo ago were normal. She does have a history of myasthenia gravis, and this does feel different than symptoms she has felt with that. Some SOB w/exertion, unclear if this is related to fatigue/pain presentation or other etiology. Pain does improve with exercise, along with other negative workup and chronicity of fatigue, diffuse pain, potentially fibromyalgia. Possibly a component of myasthenia gravis, but less likely especially with patient reporting. MAXIMILIAN, CMP, CK, ESR, and CRP were all last obtained over a year ago, so we will recheck to be sure other inflammatory or metabolic processes are not missed.     - CK total; Future  - Comprehensive metabolic panel (BMP + Alb, Alk Phos, ALT, AST, Total. Bili, TP); Future  - Anti Nuclear Rachael IgG by IFA with Reflex; Future  - ESR: Erythrocyte sedimentation rate; Future  - CRP, inflammation; Future    STAPLETON (dyspnea on exertion)  Pt has been having ongoing episodic SOB with exertion, occasionally with talking at times. She has had SOB with myasthenia gravis before, this feels like it may not be related. Unclear if associated/contributory to larger picture of fatigue or separate etiology. She has been able to increase her exercise with walking. No cough, CP, SOB at rest, other pulmonary symptoms noted. Normal cardiac/pulmonary auscultation, vital signs all within normal range. Will obtain CXR, if negative will likely consider PFTs to . Discussed with patient and she is agreeable with the plan.    - XR Chest 2 Views; Future          Subjective   Tushar is a 24 year old, presenting for the following health issues:  Consult (Increase of fatigue)    History of Present Illness       Reason for visit:  I have been extremely  "exhausted and fatigued    She eats 2-3 servings of fruits and vegetables daily.She consumes 4 sweetened beverage(s) daily.She exercises with enough effort to increase her heart rate 20 to 29 minutes per day.  She exercises with enough effort to increase her heart rate 4 days per week.   She is taking medications regularly.       I saw Tushar last Jan 2024 to establish care and for edema and multiple joint pain.  CMP, CBC, TSH, ESR, CRP, and MAXIMILIAN were checked and normal.  Rheumatology referral was placed and CCP was rechecked with Hep C which were normal.  She went to the  and ER April 2024 with fatigue and had borderline Lyme disease serology but no positive antibodies on confirmatory test. She was seen at  on 3/20 for low energy and had normal Vit D, CBC, and TSH.       Pt is returning today with complaints of ongoing fatigue, generalized body aches for over 1 year. Pt reports the problem isn't necessarily getting better or worse. Fatigue is persistent throughout the day, will have episodes of \"overwhelming\" fatigue, most commonly in the afternoon/evening, accompanied by generalized body aches worst in the lower extremities, and ankle swelling worse on the right. She has previously described joint pain, but she feels this is more generalized, not necessarily joint-specific pain. She has been also having episodic SOB with exertion over this time as well, sometimes she will be SOB even with talking. She has thought about the fatigue/SOB with exertion relating to her history of myasthenia gravis; however, she states this does not feel like usual exacerbations or experiences with myasthenia gravis in the past. She is also endorsing brain fog, decreased appetite, and nausea without vomiting over this time. Denies fever, chills, weight loss, headache, cough, CP, palpitations/racing heart, bowel/bladder changes, rashes in the past year, or joint redness/swelling. She is sleeping well, will sometimes nap in the " "afternoon for an hour. She has tried to increase her activity through walks and stretching, which have helped her body aches.  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 She drinks socially, 1-2 drinks per week. Denies tobacco use, occasional marijuana use. 1 sexual partner in the past year, not concerned for STI and reports previous testing, is on birth control. Family history: not certain but believes sister has mixed connective tissue disorder, potentially RA; no other reported family history.      Wt Readings from Last 4 Encounters:   05/14/25 59.3 kg (130 lb 11.2 oz)   03/20/24 59.1 kg (130 lb 4.8 oz)   01/03/24 58.9 kg (129 lb 14.4 oz)   10/16/23 56.7 kg (125 lb)     Constitutional, HEENT, CV, pulmonary, GI, , MSK, derm, and neuro systems are negative, except as otherwise noted.       Objective    /73 (BP Location: Right arm, Patient Position: Sitting, Cuff Size: Adult Regular)   Temp 98.5  F (36.9  C)   Resp 18   Ht 1.651 m (5' 5\")   Wt 59.3 kg (130 lb 11.2 oz)   SpO2 99%   BMI 21.75 kg/m    Body mass index is 21.75 kg/m .  Physical Exam  Constitutional:       General: She is not in acute distress.     Appearance: She is not ill-appearing or toxic-appearing.   HENT:      Right Ear: Tympanic membrane normal.      Left Ear: Tympanic membrane normal.      Mouth/Throat:      Mouth: Mucous membranes are moist.      Pharynx: Oropharynx is clear. No oropharyngeal exudate or posterior oropharyngeal erythema.   Eyes:      Conjunctiva/sclera: Conjunctivae normal.   Cardiovascular:      Rate and Rhythm: Normal rate and regular rhythm.      Heart sounds: Normal heart sounds. No murmur heard.  Pulmonary:      Effort: Pulmonary effort is normal.      Breath sounds: Normal breath sounds. No wheezing, rhonchi or rales.   Abdominal:      General: Bowel sounds are normal.      Palpations: Abdomen is " soft.      Tenderness: There is no abdominal tenderness.   Musculoskeletal:         General: No swelling.   Skin:     General: Skin is warm.   Neurological:      General: No focal deficit present.      Mental Status: She is alert.                  Medical Student involved: Kamran Hamilton, MS2

## 2025-05-15 PROBLEM — M79.7 FIBROMYALGIA: Status: ACTIVE | Noted: 2025-05-15

## 2025-05-15 LAB — ANA SER QL IF: NEGATIVE

## 2025-05-15 NOTE — RESULT ENCOUNTER NOTE
Your MAXIMILIAN autoimmune test is normal again, so I wonder about fibromyalgia as a cause for your symptoms.  I recommend trying some guided physical therapy for this, so I placed a referral and they will call you to schedule.

## 2025-05-15 NOTE — RESULT ENCOUNTER NOTE
Your chest x-ray is normal, so I recommend repeating the lung function testing for further evaluation of your shortness of breath.  Please call [phone] to schedule the lung function test.

## 2025-08-24 ASSESSMENT — ACTIVITIES OF DAILY LIVING (ADL)
CHEWING: NORMAL
SWALLOWING: RARE CHOKING
DOUBLE_VISION: PRESENT, NOT DAILY
TALKING: INTERMITTENT SLURRING OR NASAL SPEECH
MYC_MG-ADL-TOTAL_SCORE: 1
BREATHING: SHORTNESS OF BREATH WITH EXERTION
IMPAIRMENT OF ABILITY TO BRUSH TEETH/COMB HAIR: NORMAL
IMPAIRMENT OF ABILITY TO RISE FROM CHAIR: NORMAL

## 2025-08-25 ENCOUNTER — OFFICE VISIT (OUTPATIENT)
Dept: NEUROLOGY | Facility: CLINIC | Age: 25
End: 2025-08-25
Payer: COMMERCIAL

## 2025-08-25 VITALS
WEIGHT: 136.6 LBS | BODY MASS INDEX: 22.73 KG/M2 | DIASTOLIC BLOOD PRESSURE: 74 MMHG | OXYGEN SATURATION: 99 % | SYSTOLIC BLOOD PRESSURE: 123 MMHG | HEART RATE: 82 BPM

## 2025-08-25 DIAGNOSIS — G70.00 MYASTHENIA GRAVIS WITHOUT EXACERBATION (H): ICD-10-CM

## 2025-08-25 PROCEDURE — 3078F DIAST BP <80 MM HG: CPT | Performed by: PSYCHIATRY & NEUROLOGY

## 2025-08-25 PROCEDURE — 99214 OFFICE O/P EST MOD 30 MIN: CPT | Performed by: PSYCHIATRY & NEUROLOGY

## 2025-08-25 PROCEDURE — 3074F SYST BP LT 130 MM HG: CPT | Performed by: PSYCHIATRY & NEUROLOGY

## 2025-08-25 RX ORDER — PYRIDOSTIGMINE BROMIDE 60 MG/1
60 TABLET ORAL 3 TIMES DAILY PRN
Qty: 90 TABLET | Refills: 3 | Status: SHIPPED | OUTPATIENT
Start: 2025-08-25

## (undated) DEVICE — SU VICRYL 2-0 UR-6 27" J602H

## (undated) DEVICE — SOL NACL 0.9% INJ 1000ML BAG 2B1324X

## (undated) DEVICE — TUBING SUCTION MEDI-VAC 1/4"X20' N620A

## (undated) DEVICE — SUCTION TIP YANKAUER W/O VENT K86

## (undated) DEVICE — LUBRICANT INST KIT ENDO-LUBE 220-90

## (undated) DEVICE — LINEN TOWEL PACK X30 5481

## (undated) DEVICE — SU DERMABOND ADVANCED .7ML DNX12

## (undated) DEVICE — LIGHT HANDLE X2

## (undated) DEVICE — ENDO POUCH UNIV RETRIEVAL SYSTEM INZII 10MM CD001

## (undated) DEVICE — LINEN GOWN XLG 5407

## (undated) DEVICE — GLOVE PROTEXIS W/NEU-THERA 7.5  2D73TE75

## (undated) DEVICE — NDL 25GA 1.5" 305127

## (undated) DEVICE — SOL WATER IRRIG 1000ML BOTTLE 2F7114

## (undated) DEVICE — SU MONOCRYL 5-0 P-3 18" UND Y493G

## (undated) DEVICE — SU SILK 2-0 SH 30" K833H

## (undated) DEVICE — Device

## (undated) DEVICE — PREP TECHNI-CARE CHLOROXYLENOL 3% 4OZ BOTTLE C222-4ZWO

## (undated) DEVICE — PEN MARKING SKIN W/LABELS 31145918

## (undated) DEVICE — ESU LIGASURE LAPAROSCOPIC BLUNT TIP SEALER 5MMX37CM LF1837

## (undated) DEVICE — STRAP KNEE/BODY 31143004

## (undated) DEVICE — SYR 30ML SLIP TIP W/O NDL

## (undated) DEVICE — NDL INSUFFLATION 14GA STEP S100000

## (undated) DEVICE — SOL NACL 0.9% IRRIG 1000ML BOTTLE 2F7124

## (undated) DEVICE — SYR 10ML SLIP TIP W/O NDL 303134

## (undated) DEVICE — ENDO TROCAR 12MM VERSASTEP VS101012P

## (undated) DEVICE — DRAPE STERI TOWEL SM 1000

## (undated) DEVICE — SU VICRYL 3-0 RB-1 27" UND J215H

## (undated) DEVICE — LINEN TOWEL PACK X5 5464

## (undated) DEVICE — GLOVE PROTEXIS W/NEU-THERA 6.0  2D73TE60

## (undated) DEVICE — LIGHT HANDLE X1 31140133

## (undated) DEVICE — SURGICEL HEMOSTAT 4X8" 1952

## (undated) DEVICE — ENDO TROCAR 05MM VERSASTEP VS101005

## (undated) DEVICE — ENDO VALVE SUCTION BRONCH EVIS MAJ-209

## (undated) DEVICE — ANTIFOG SOLUTION W/FOAM PAD 31142527

## (undated) RX ORDER — CEFAZOLIN SODIUM 1 G/3ML
INJECTION, POWDER, FOR SOLUTION INTRAMUSCULAR; INTRAVENOUS
Status: DISPENSED
Start: 2018-08-15

## (undated) RX ORDER — ACETAMINOPHEN 325 MG/1
TABLET ORAL
Status: DISPENSED
Start: 2022-07-11

## (undated) RX ORDER — FENTANYL CITRATE 50 UG/ML
INJECTION, SOLUTION INTRAMUSCULAR; INTRAVENOUS
Status: DISPENSED
Start: 2018-08-15

## (undated) RX ORDER — ONDANSETRON 2 MG/ML
INJECTION INTRAMUSCULAR; INTRAVENOUS
Status: DISPENSED
Start: 2018-08-15

## (undated) RX ORDER — REMIFENTANIL HYDROCHLORIDE 1 MG/ML
INJECTION, POWDER, LYOPHILIZED, FOR SOLUTION INTRAVENOUS
Status: DISPENSED
Start: 2018-08-15

## (undated) RX ORDER — DIPHENHYDRAMINE HCL 25 MG
CAPSULE ORAL
Status: DISPENSED
Start: 2022-08-01

## (undated) RX ORDER — PHENYLEPHRINE HCL IN 0.9% NACL 1 MG/10 ML
SYRINGE (ML) INTRAVENOUS
Status: DISPENSED
Start: 2018-08-15

## (undated) RX ORDER — ACETAMINOPHEN 325 MG/1
TABLET ORAL
Status: DISPENSED
Start: 2022-08-01

## (undated) RX ORDER — DIPHENHYDRAMINE HCL 25 MG
CAPSULE ORAL
Status: DISPENSED
Start: 2022-07-11

## (undated) RX ORDER — PROPOFOL 10 MG/ML
INJECTION, EMULSION INTRAVENOUS
Status: DISPENSED
Start: 2018-08-15

## (undated) RX ORDER — BUPIVACAINE HYDROCHLORIDE 2.5 MG/ML
INJECTION, SOLUTION INFILTRATION; PERINEURAL
Status: DISPENSED
Start: 2018-08-15